# Patient Record
Sex: FEMALE | Race: WHITE | NOT HISPANIC OR LATINO | Employment: OTHER | URBAN - METROPOLITAN AREA
[De-identification: names, ages, dates, MRNs, and addresses within clinical notes are randomized per-mention and may not be internally consistent; named-entity substitution may affect disease eponyms.]

---

## 2023-09-29 DIAGNOSIS — Z96.612 STATUS POST TOTAL SHOULDER REPLACEMENT, LEFT: Primary | ICD-10-CM

## 2023-09-29 DIAGNOSIS — M25.512 LEFT SHOULDER PAIN, UNSPECIFIED CHRONICITY: ICD-10-CM

## 2023-10-01 PROBLEM — M17.11 PRIMARY OSTEOARTHRITIS OF RIGHT KNEE: Status: ACTIVE | Noted: 2023-10-01

## 2023-10-01 PROBLEM — F41.9 ANXIETY: Status: ACTIVE | Noted: 2023-10-01

## 2023-10-01 PROBLEM — C44.519 BASAL CELL CARCINOMA OF SKIN OF OTHER PART OF TRUNK: Status: ACTIVE | Noted: 2021-07-20

## 2023-10-01 PROBLEM — G24.01 TARDIVE DYSKINESIA: Status: ACTIVE | Noted: 2023-10-01

## 2023-10-01 PROBLEM — L81.4 OTHER MELANIN HYPERPIGMENTATION: Status: ACTIVE | Noted: 2021-07-20

## 2023-10-01 PROBLEM — S81.819A SKIN TEAR OF LOWER LEG WITHOUT COMPLICATION, INITIAL ENCOUNTER: Status: ACTIVE | Noted: 2023-10-01

## 2023-10-01 PROBLEM — L57.0 ACTINIC KERATOSIS: Status: ACTIVE | Noted: 2021-07-20

## 2023-10-01 PROBLEM — B37.2 CANDIDAL INTERTRIGO: Status: ACTIVE | Noted: 2023-10-01

## 2023-10-01 PROBLEM — S69.91XA INJURY OF RIGHT WRIST: Status: ACTIVE | Noted: 2023-10-01

## 2023-10-01 PROBLEM — R07.9 CHEST PAIN: Status: ACTIVE | Noted: 2023-10-01

## 2023-10-01 PROBLEM — L85.3 XEROSIS CUTIS: Status: ACTIVE | Noted: 2021-07-20

## 2023-10-01 PROBLEM — F32.5 MAJOR DEPRESSIVE DISORDER IN REMISSION (CMS-HCC): Status: ACTIVE | Noted: 2023-10-01

## 2023-10-01 PROBLEM — I26.99 PULMONARY EMBOLISM (MULTI): Status: ACTIVE | Noted: 2023-10-01

## 2023-10-01 PROBLEM — D22.5 MELANOCYTIC NEVI OF TRUNK: Status: ACTIVE | Noted: 2021-07-20

## 2023-10-01 PROBLEM — S42.253A CLOSED FRACTURE OF GREATER TUBEROSITY OF HUMERUS: Status: ACTIVE | Noted: 2023-10-01

## 2023-10-01 PROBLEM — Z85.828 PERSONAL HISTORY OF OTHER MALIGNANT NEOPLASM OF SKIN: Status: ACTIVE | Noted: 2021-07-20

## 2023-10-01 PROBLEM — H93.299 ABNORMAL AUDITORY PERCEPTION: Status: ACTIVE | Noted: 2023-10-01

## 2023-10-01 PROBLEM — H90.3 SENSORINEURAL HEARING LOSS, BILATERAL: Status: ACTIVE | Noted: 2023-10-01

## 2023-10-01 PROBLEM — Z96.1 PSEUDOPHAKIA OF BOTH EYES: Status: ACTIVE | Noted: 2023-10-01

## 2023-10-01 PROBLEM — G25.81 RESTLESS LEGS SYNDROME: Status: ACTIVE | Noted: 2023-10-01

## 2023-10-01 PROBLEM — B02.9 HERPES ZOSTER: Status: ACTIVE | Noted: 2023-10-01

## 2023-10-01 PROBLEM — S42.209A CLOSED FRACTURE OF UPPER END OF HUMERUS: Status: ACTIVE | Noted: 2023-10-01

## 2023-10-01 PROBLEM — K21.9 GASTROESOPHAGEAL REFLUX DISEASE: Status: ACTIVE | Noted: 2023-10-01

## 2023-10-01 PROBLEM — H61.23 EXCESSIVE CERUMEN IN BOTH EAR CANALS: Status: ACTIVE | Noted: 2023-10-01

## 2023-10-01 PROBLEM — M89.8X1 CHRONIC SCAPULAR PAIN: Status: ACTIVE | Noted: 2023-10-01

## 2023-10-01 PROBLEM — M79.604 PAIN OF RIGHT LOWER EXTREMITY: Status: ACTIVE | Noted: 2023-10-01

## 2023-10-01 PROBLEM — L23.9 ALLERGIC CONTACT DERMATITIS, UNSPECIFIED CAUSE: Status: ACTIVE | Noted: 2021-07-20

## 2023-10-01 PROBLEM — H69.93 DYSFUNCTION OF BOTH EUSTACHIAN TUBES: Status: ACTIVE | Noted: 2023-10-01

## 2023-10-01 PROBLEM — G25.81 IRON DEFICIENCY ASSOCIATED WITH NONFAMILIAL RESTLESS LEGS SYNDROME: Status: ACTIVE | Noted: 2023-10-01

## 2023-10-01 PROBLEM — M17.10 LOCALIZED PRIMARY OSTEOARTHRITIS OF LOWER LEG: Status: ACTIVE | Noted: 2023-10-01

## 2023-10-01 PROBLEM — H93.13 TINNITUS OF BOTH EARS: Status: ACTIVE | Noted: 2023-10-01

## 2023-10-01 PROBLEM — D48.5 NEOPLASM OF UNCERTAIN BEHAVIOR OF SKIN: Status: ACTIVE | Noted: 2021-07-20

## 2023-10-01 PROBLEM — I10 ESSENTIAL HYPERTENSION: Status: ACTIVE | Noted: 2023-10-01

## 2023-10-01 PROBLEM — G47.00 INSOMNIA: Status: ACTIVE | Noted: 2023-10-01

## 2023-10-01 PROBLEM — J44.9 CHRONIC OBSTRUCTIVE LUNG DISEASE (MULTI): Status: ACTIVE | Noted: 2023-10-01

## 2023-10-01 PROBLEM — E61.1 IRON DEFICIENCY ASSOCIATED WITH NONFAMILIAL RESTLESS LEGS SYNDROME: Status: ACTIVE | Noted: 2023-10-01

## 2023-10-01 PROBLEM — J31.0 CHRONIC RHINITIS: Status: ACTIVE | Noted: 2023-10-01

## 2023-10-01 PROBLEM — L30.9 DERMATITIS, UNSPECIFIED: Status: ACTIVE | Noted: 2021-07-20

## 2023-10-01 PROBLEM — S81.819A LACERATION OF LOWER EXTREMITY: Status: ACTIVE | Noted: 2023-10-01

## 2023-10-01 PROBLEM — R85.9: Status: ACTIVE | Noted: 2023-10-01

## 2023-10-01 PROBLEM — C44.612 BASAL CELL CARCINOMA OF SKIN OF RIGHT UPPER LIMB, INCLUDING SHOULDER: Status: ACTIVE | Noted: 2021-07-20

## 2023-10-01 PROBLEM — D18.01 HEMANGIOMA OF SKIN AND SUBCUTANEOUS TISSUE: Status: ACTIVE | Noted: 2021-07-20

## 2023-10-01 PROBLEM — G89.29 CHRONIC SCAPULAR PAIN: Status: ACTIVE | Noted: 2023-10-01

## 2023-10-01 PROBLEM — I87.2 CHRONIC VENOUS INSUFFICIENCY: Status: ACTIVE | Noted: 2021-07-20

## 2023-10-01 PROBLEM — K52.9 GASTROENTERITIS, ACUTE: Status: ACTIVE | Noted: 2023-10-01

## 2023-10-01 PROBLEM — H52.31 ANISOMETROPIA: Status: ACTIVE | Noted: 2023-10-01

## 2023-10-01 RX ORDER — ALPRAZOLAM 0.5 MG/1
TABLET ORAL
COMMUNITY
Start: 2020-12-31

## 2023-10-01 RX ORDER — FLUTICASONE PROPIONATE 44 UG/1
2 AEROSOL, METERED RESPIRATORY (INHALATION) 2 TIMES DAILY
COMMUNITY
Start: 2023-09-01

## 2023-10-01 RX ORDER — LOSARTAN POTASSIUM 100 MG/1
100 TABLET ORAL DAILY
COMMUNITY

## 2023-10-01 RX ORDER — AMMONIUM LACTATE 12 G/100G
CREAM TOPICAL
COMMUNITY
Start: 2018-11-12

## 2023-10-01 RX ORDER — ATORVASTATIN CALCIUM 80 MG/1
1 TABLET, FILM COATED ORAL DAILY
COMMUNITY
Start: 2021-04-06

## 2023-10-01 RX ORDER — ZOLPIDEM TARTRATE 12.5 MG/1
12.5 TABLET, FILM COATED, EXTENDED RELEASE ORAL NIGHTLY
COMMUNITY
Start: 2023-07-10

## 2023-10-01 RX ORDER — ACETAMINOPHEN 325 MG/1
650 TABLET ORAL EVERY 6 HOURS
COMMUNITY
Start: 2015-12-18

## 2023-10-01 RX ORDER — TRAMADOL HYDROCHLORIDE 50 MG/1
50 TABLET ORAL EVERY 6 HOURS PRN
COMMUNITY
Start: 2023-06-26

## 2023-10-01 RX ORDER — BENZONATATE 100 MG/1
100 CAPSULE ORAL 3 TIMES DAILY PRN
COMMUNITY
Start: 2023-05-01

## 2023-10-01 RX ORDER — ACETAMINOPHEN 500 MG
TABLET ORAL
COMMUNITY

## 2023-10-01 RX ORDER — FLUOXETINE 10 MG/1
CAPSULE ORAL
COMMUNITY

## 2023-10-01 RX ORDER — POLYETHYLENE GLYCOL 1450
POWDER (GRAM) MISCELLANEOUS
COMMUNITY
Start: 2021-04-06

## 2023-10-01 RX ORDER — TRIAMCINOLONE ACETONIDE 1 MG/G
CREAM TOPICAL
COMMUNITY
Start: 2020-06-26

## 2023-10-01 RX ORDER — MELOXICAM 7.5 MG/1
7.5 TABLET ORAL DAILY PRN
COMMUNITY
Start: 2022-12-27

## 2023-10-01 RX ORDER — BUSPIRONE HYDROCHLORIDE 5 MG/1
5 TABLET ORAL 3 TIMES DAILY
COMMUNITY
Start: 2023-08-08

## 2023-10-01 RX ORDER — MUPIROCIN 20 MG/G
OINTMENT TOPICAL
COMMUNITY
Start: 2022-08-02

## 2023-10-01 RX ORDER — HYDROCHLOROTHIAZIDE 12.5 MG/1
1 CAPSULE ORAL DAILY
COMMUNITY
Start: 2021-04-06

## 2023-10-01 RX ORDER — HYDROCORTISONE 25 MG/G
CREAM TOPICAL
COMMUNITY
Start: 2021-07-20

## 2023-10-01 RX ORDER — SOLIFENACIN SUCCINATE 10 MG/1
TABLET, FILM COATED ORAL
COMMUNITY
Start: 2021-04-06

## 2023-10-01 RX ORDER — GABAPENTIN 300 MG/1
300 CAPSULE ORAL 4 TIMES DAILY
COMMUNITY

## 2023-10-01 RX ORDER — AZELASTINE 1 MG/ML
2 SPRAY, METERED NASAL 2 TIMES DAILY
COMMUNITY

## 2023-10-01 RX ORDER — DOCUSATE SODIUM 100 MG/1
1 CAPSULE, LIQUID FILLED ORAL 2 TIMES DAILY
COMMUNITY
Start: 2021-04-06

## 2023-10-01 RX ORDER — FLUOXETINE HYDROCHLORIDE 40 MG/1
40 CAPSULE ORAL DAILY
COMMUNITY
Start: 2023-08-08

## 2023-10-01 RX ORDER — FAMOTIDINE 40 MG/1
1 TABLET, FILM COATED ORAL DAILY
COMMUNITY
Start: 2021-04-06

## 2023-10-01 RX ORDER — COVID-19 ANTIGEN TEST
KIT MISCELLANEOUS
COMMUNITY
Start: 2023-03-23

## 2023-10-01 RX ORDER — PREDNISONE 10 MG/1
10 TABLET ORAL
COMMUNITY
Start: 2021-05-28

## 2023-10-01 RX ORDER — POLYETHYLENE GLYCOL 3350 17 G/17G
17 POWDER, FOR SOLUTION ORAL AS NEEDED
COMMUNITY

## 2023-10-01 RX ORDER — DOXYCYCLINE 100 MG/1
100 CAPSULE ORAL 2 TIMES DAILY
COMMUNITY
Start: 2023-01-20

## 2023-10-01 RX ORDER — ALBUTEROL SULFATE 90 UG/1
2 AEROSOL, METERED RESPIRATORY (INHALATION) EVERY 4 HOURS PRN
COMMUNITY
Start: 2023-02-20

## 2023-10-01 RX ORDER — ZOLPIDEM TARTRATE 6.25 MG/1
1 TABLET, FILM COATED, EXTENDED RELEASE ORAL NIGHTLY PRN
COMMUNITY
Start: 2021-04-28

## 2023-10-01 RX ORDER — BETAMETHASONE DIPROPIONATE 0.5 MG/G
CREAM TOPICAL
COMMUNITY
Start: 2021-05-26

## 2023-10-01 RX ORDER — AMLODIPINE BESYLATE 10 MG/1
10 TABLET ORAL DAILY
COMMUNITY

## 2023-10-01 RX ORDER — AMOXICILLIN AND CLAVULANATE POTASSIUM 875; 125 MG/1; MG/1
875 TABLET, FILM COATED ORAL 2 TIMES DAILY
COMMUNITY
Start: 2023-01-17

## 2023-10-01 RX ORDER — FLUTICASONE PROPIONATE 50 MCG
2 SPRAY, SUSPENSION (ML) NASAL DAILY
COMMUNITY

## 2023-10-03 ENCOUNTER — APPOINTMENT (OUTPATIENT)
Dept: PHYSICAL THERAPY | Facility: CLINIC | Age: 78
End: 2023-10-03
Payer: MEDICARE

## 2023-10-05 ENCOUNTER — TREATMENT (OUTPATIENT)
Dept: PHYSICAL THERAPY | Facility: CLINIC | Age: 78
End: 2023-10-05
Payer: MEDICARE

## 2023-10-05 DIAGNOSIS — Z96.612 STATUS POST TOTAL SHOULDER REPLACEMENT, LEFT: ICD-10-CM

## 2023-10-05 DIAGNOSIS — M25.512 LEFT SHOULDER PAIN, UNSPECIFIED CHRONICITY: ICD-10-CM

## 2023-10-05 PROCEDURE — 97110 THERAPEUTIC EXERCISES: CPT | Mod: GP,CQ

## 2023-10-05 ASSESSMENT — ENCOUNTER SYMPTOMS
LOSS OF SENSATION IN FEET: 0
DEPRESSION: 1
OCCASIONAL FEELINGS OF UNSTEADINESS: 0

## 2023-10-05 NOTE — PROGRESS NOTES
"Physical Therapy    Physical Therapy Treatment and Discharge Summary    Patient Name: Margaret Thomas  MRN: [id number]  Today's Date: 10/5/2023    Time Calculation  Start Time: 1015  Stop Time: 1100  Time Calculation (min): 45 min    Visit #: 20 out of 20  Evaluation date: 7/18/23    Current Problem:   1. Status post total shoulder replacement, left  Follow Up In Physical Therapy      2. Left shoulder pain, unspecified chronicity  Follow Up In Physical Therapy          ASSESSMENT:   Completed todays session without incident. Pt is functionally independent. Focused todays session on reviewing and updated patients home exercise program. Pt was able to show good return demonstration current exercise program. Recommend  pt continue at home with current HEP for further gains in left shoulder ROM and strength.     PLAN:   Pt appropriate for discharge with HEP to promote self management.  OP PT Plan  Treatment/Interventions: Education/ Instruction, Manual therapy, Therapeutic activities  PT Plan: Skilled PT  PT Frequency: 2 times per week  Duration: 8 weeks (+ 4 visits 1x/week for total 20 visits to promote self managment)  Rehab Potential: Good  Plan of Care Agreement: Patient      SUBJECTIVE:   Pt able to perform all dressing needs with increased time and effort and modified technique. Performs HEP \"every other day\"  requesting printout for pictures. Able to sleep through the night without left shoulder sx's waking her           Pain: Current pain level: 1/10 \"sore\". Pain at highest 3/10         OBJECTIVE:    AROM left shoulder   Flexion supine 155 degrees, standing 115 degrees  Abduction 110 degrees standing  Int rotation to back pocket    MMT left shoulder  Flexion 4+/5  Abd 4/5  Int rotation 4+/5  Ext rotation 4/5      Treatments:  Therapeutic Exercise: (45 minutes)   Review and updated pictures for current HEP as follows  Standing shoulder flexion towel slide at wall 10 x 2  Left shoulder isometric flexion /int " rotation/ext rotation 10 x 2 each  Scap adduction orange theraband 10 x 2  S/L left shl ext rotation 1# 10 x 2  S/L left shl flexion 1# 10 x 2  Supine AROM left shl flexion 10 x 2         HEP:  See above.  Handouts issued    Goals:   ST. Pt will improve pain levels from 4/10 to </=2/10 to improve quality of life.--PROGRESSING (3/10)  2. Pt will improve R shoulder AAROM to WFL in order to improve mobility.--MET  3. Pt will maintain surgical precautions to promote healing and optimal surgical outcomes.--MET    LT. Pt will improve R shoulder AROM to WFL in order to improve ability to don/doff clothing and do hair without compensation. (Progressing, can perform with compensation)  2. Pt will improve R shoulder strength to >/=4+/5 to promote improved GHJ stabilty and ADL's. ( Progressing)  3. Pt will ambulate without gait deviation to promote return to prior level of function and activity.(Achieved)

## 2023-11-14 ENCOUNTER — DOCUMENTATION (OUTPATIENT)
Dept: PHYSICAL THERAPY | Facility: CLINIC | Age: 78
End: 2023-11-14
Payer: MEDICARE

## 2023-11-14 NOTE — PROGRESS NOTES
Physical Therapy    Discharge Summary    Name: Margaret Thomas  MRN: [id number]  : 1945  Date: 23    Medical Diagnosis: S/P L reverse total shoulder for proximal humerus fracture   Therapy Diagnosis: L shoulder pain, decrease mobility, LE weakness    Discharge Summary: PT    Discharge Information: Date of last visit 10/5/23, Date of evaluation 23, and Number of attended visits 20    Therapy Summary: Patient made good progress through POC and is functionally independent. Patient has met 2/3 short term goals and 1/3 long term goals limited by pain, ROM and strength. Patient has improved AROM/AAROM and strength compared to evaluation. Patient is independent with HEP. Patient is recommended to continue current HEP to continue to improve L shoulder AROM and strength.     ST. Pt will improve pain levels from 4/10 to </=2/10 to improve quality of life.--PROGRESSING (3/10)  2. Pt will improve R shoulder AAROM to WFL in order to improve mobility.--MET  3. Pt will maintain surgical precautions to promote healing and optimal surgical outcomes.--MET    LT. Pt will improve R shoulder AROM to WFL in order to improve ability to don/doff clothing and do hair without compensation. -PROGRESSING (can perform with compensation)  2. Pt will improve R shoulder strength to >/=4+/5 to promote improved GHJ stabilty and ADL's. -PROGRESSING  3. Pt will ambulate without gait deviation to promote return to prior level of function and activity.-ACHIEVED     Rehab Discharge Reason: Achieved all and/or the most significant goals(s)

## 2023-11-20 ENCOUNTER — EVALUATION (OUTPATIENT)
Dept: PHYSICAL THERAPY | Facility: CLINIC | Age: 78
End: 2023-11-20
Payer: MEDICARE

## 2023-11-20 DIAGNOSIS — M54.50 CHRONIC LOW BACK PAIN: Primary | ICD-10-CM

## 2023-11-20 DIAGNOSIS — M53.3 SACROCOCCYGEAL DISORDERS, NOT ELSEWHERE CLASSIFIED: ICD-10-CM

## 2023-11-20 DIAGNOSIS — G89.29 CHRONIC LOW BACK PAIN: Primary | ICD-10-CM

## 2023-11-20 DIAGNOSIS — M48.062 SPINAL STENOSIS, LUMBAR REGION WITH NEUROGENIC CLAUDICATION: ICD-10-CM

## 2023-11-20 PROCEDURE — 97161 PT EVAL LOW COMPLEX 20 MIN: CPT | Mod: GP

## 2023-11-20 PROCEDURE — 97110 THERAPEUTIC EXERCISES: CPT | Mod: GP

## 2023-11-20 NOTE — PROGRESS NOTES
Physical Therapy    Physical Therapy Evaluation and Treatment      Patient Name: Margaret Thomas  MRN: [id number]  Today's Date: 11/20/2023    Time Calculation  Start Time: 1207  Stop Time: 1245  Time Calculation (min): 38 min    Current Problem:   1. Chronic low back pain  Follow Up In Physical Therapy      2. Sacrococcygeal disorders, not elsewhere classified  PT eval and treat      3. Spinal stenosis, lumbar region with neurogenic claudication  PT eval and treat          SUBJECTIVE:  Margaret Thomas is a 78 y.o. female referred to PT for LBP. Patient presents with chief complaint of R SIJ/ R hip region. Patient reports that back pain has bothering her 1973 pruning tree and pulled something in her back. Patient went to chiropractor x-ray revealing arthritis in lower back. Recently patient went out of town around 10/16 and reports that before to leaving was sifting dirt and thinks she aggravated her back. Patient went back to chiropractor who did manipulation and acupuncture which helped to reduce pain with transfers but still present. Patient would like injection but is going through PT first. Patient denies radicular pain, denies numbness/tingling. Localized pain in R SIJ/R hip. Patient reports that she has PAD and is encouraged to walk 30 minutes every day but is unable to secondary to low back pain, only able to walk 10 minutes at a time. Patient reports that she plans to walk in the mall during winter time.   Stair negotiation current performing step to pattern secondary to fatigue in legs. Patient does have stairs in home with railing on both sides.     Imaging: none    Occupation: Retired   Hobbies: Everything; stays active, work outside in garden and yard.   Home living: Living with sister     Relevant Past Medical History:Reviewed in chart ; HTN, Vascular Disease, Headahces,   Surgical History: Reviewed in chart; R TKR (2015), L rTSA (5/19/23)   Medications: Reviewed in chart  Allergies: Reviewed in  "chart    Precautions: PMHx considerations: HTN, Vascular Disease  STEADI Fall Risk: will do at next follow up (score of 4+ indicates fall risk)       PT Outcome Measure:   Modified Oswestry: 23/50= 46%     Pain:  Lowest:  6-7/10  Highest:  10/10  Location:  R SIJ, R hip   Description:  sharp, constant ache  Aggravating Factors:  transfer sit to stand, standing, walking, lifting   Relieving Factors:  heat  Sleep Pattern:  stomach sleeper; can feel some discomfort with rolling  Previous Interventions:  Cortizone 1970s but nothing recent       Red flags: denies numbness/tingling or saddle paresthesia, no changes to bowel or bladder    Patient/Family Goal: relieve pain, improve quality of life    OBJECTIVE:    Observation/Posture: Forward head and forward shoulder posture     Gait: Intermittent R lateral lean, decreased step length and gait speed     Functional Mobility: Patient demonstrates sit to stand transfer with UE support.     Range of Motion:   LUMBAR ROM AROM    LEFT RIGHT   Sidebend 25 % limited, pain * 25% limited, pain**   Rotation WFL, pain free  WFL, pain   Flexion WFL, B hamstring tightness    Extension WFL , pain free      HIP ROM AROM    LEFT RIGHT   Flexion WFL, pain * WFL, pain **     Strength:  HIP MMT LEFT RIGHT   Flexion 4-/5 4-/5   Extension - -   Abduction 4/5 4/5   Adduction 4/5 4/5     KNEE MMT LEFT RIGHT   Flexion 4+/5 4+/5   Extension 4+/5 4+/5     ANKLE MMT LEFT RIGHT   Dorsiflexion 5/5 5/5   Flexibility:   FLEXIBILITY LEFT RIGHT   Hamstring Min limitation Min limitation     Special Testing:  SPECIAL TEST LEFT RIGHT   SLR negative negative   BARBI negative negative     Pelvic Symmetry:   LLD: symmetrical    ASIS: symmetrical     Treatments:  Therapeutic Exercise: (10 minutes)  H/L Hip ADD (ball) 3\" x10  H/L Hip ABD (green TB) x10  H/L TrA brace x10  H/L glute set x10   Manual Therapy: ( minutes)    Gait Training: ( minutes)    Neuromuscular Re-education: ( minutes)    Therapeutic Activity: " (5 minutes)  OP Education: Pt education on purpose of interventions in order to improve postural strength and promote spinal stability. Pt education on importance of core strength with low back pain. Pt education on proper seated posture to reduce stress on spine.      HEP:  Access Code: [email or web address]  URL: [email or web address]  Date: 11/20/2023  Prepared by: Brooke    Exercises  - Supine Transversus Abdominis Bracing - Hands on Stomach  - 1 x daily - 7 x weekly - 2 sets - 10 reps  - Hooklying Gluteal Sets  - 1 x daily - 7 x weekly - 2 sets - 10 reps  - Supine Hip Adduction Isometric with Ball  - 1 x daily - 7 x weekly - 2 sets - 10 reps  - Hooklying Clamshell with Resistance  - 1 x daily - 7 x weekly - 2 sets - 10 reps  ASSESSMENT:  Margraet Thomas is a 78 y.o. female referred to PT for LBP. Patient presents with chief complaint of R SIJ/ R hip region. Pain worse with transfer from sit to stand, walking, standing and lifting. Patient demonstrates lumbar AROM WFL in all planes with exception of lateral flexion. Patient reports pain with lateral flexion R>L and rotation R>L. Patient demonstrates decreased gross LE strength, poor posture, and poor gait mechanics. Symmetrical pelvic alignment at evaluation. Patient would benefit from skilled PT in order to promote painfree functional mobility and address aforementioned impairments.     Response to treatment: Pt verbalized good understanding of education provided. Pt demonstrated appropriate performance of HEP with good understanding. Did not exhibit exacerbation of symptoms at end of session.     PLAN:  OP PT PLAN:  Treatment/Interventions: Education/Instruction , Gait training , Manual Therapy  , Neuromuscular re-education , Self care/home management , Therapeutic activities , and Therapeutic exercise    PT Plan: Skilled PT   PT Frequency: 1-2 times per week  Duration: 6 weeks  Visits Approved: MN  Rehab Potential: Good  Plan of Care Agreement:  Patient    Goals:  SHORT TERM GOALS:  Pt will report decrease in pain from 10/10 to </= 5/10 to improve quality of life.  Pt will demonstrate sit to stand transfer x10 without UE pain free in order to demonstrate improved LE strength   Pt will demonstrate painfree lumbar AROM in all planes in order to improve mobility.    LONG TERM GOALS:  Pt will report decrease in pain from 10/10 to </= 3/10 to improve quality of life.  Pt will perform reciprocal stair negotiation with UE support to demonstrate improved LE strength   Pt will ambulate for >30 minutes pain free to promote return to PLOF and improved vascularity.   Pt will improve score on Modified Oswestry from 46% to </= 23% impairment reflect improvement in functional mobility.

## 2023-11-20 NOTE — LETTER
November 20, 2023    Becka Ramirez  [address]  [facility] [address]    Patient: Margaret Thomas   YOB: 1945   Date of Visit: 11/20/2023       Dear Nitin Weston Md  [address]  [address]  [place] [address]    The attached plan of care is being sent to you because your patient’s medical reimbursement requires that you certify the plan of care. Your signature is required to allow uninterrupted insurance coverage.      You may indicate your approval by signing below and faxing this form back to us at No information on file..    Please call No information on file. with any questions or concerns.    Thank you for this referral,        Brooke Mohamud PT  [facility] [address] [place] [address] [facility] [address]  [facility] [address]    Payer: Payor: [employer]ICARE / Plan: [employer] MEDICARE / Product Type: *No Product type* /                                                                         Date:     Dear Brooke Mohamud PT,     Re: Ms. Margaret Thomas, MRN:[id number]    I certify that I have reviewed the attached plan of care and it is medically necessary for Ms. Margaret Thomas (1945) who is under my care.          ______________________________________                    _________________  Provider name and credentials                                           Date and time                                                                                           Plan of Care 11/20/23   Effective from: 11/20/2023  Effective to: 2/18/2024    Plan ID: [id number]            Participants as of Finalize on 11/20/2023    Name Type Comments Contact Info    Becka Ramirez Consulting Physician  [phone]    Brooke Mohamud PT Physical Therapist  [phone]       Last Plan Note     Author: Brooke Mohamud PT Status: Sign when Signing Visit Last edited: 11/20/2023 12:00 PM       Physical Therapy    Physical Therapy Evaluation and  Treatment      Patient Name: Margaret Thomas  MRN: [id number]  Today's Date: 11/20/2023    Time Calculation  Start Time: 1207  Stop Time: 1245  Time Calculation (min): 38 min    Current Problem:   1. Chronic low back pain  Follow Up In Physical Therapy      2. Sacrococcygeal disorders, not elsewhere classified  PT eval and treat      3. Spinal stenosis, lumbar region with neurogenic claudication  PT eval and treat          SUBJECTIVE:  Margaret Thomas is a 78 y.o. female referred to PT for LBP. Patient presents with chief complaint of R SIJ/ R hip region. Patient reports that back pain has bothering her 1973 pruning tree and pulled something in her back. Patient went to chiropractor x-ray revealing arthritis in lower back. Recently patient went out of town around 10/16 and reports that before to leaving was sifting dirt and thinks she aggravated her back. Patient went back to chiropractor who did manipulation and acupuncture which helped to reduce pain with transfers but still present. Patient would like injection but is going through PT first. Patient denies radicular pain, denies numbness/tingling. Localized pain in R SIJ/R hip. Patient reports that she has PAD and is encouraged to walk 30 minutes every day but is unable to secondary to low back pain, only able to walk 10 minutes at a time. Patient reports that she plans to walk [place] the mall during winter time.   Stair negotiation current performing step to pattern secondary to fatigue in legs. Patient does have stairs in home with railing on both sides.     Imaging: none    Occupation: Retired   Hobbies: Everything; stays active, work outside in garden and yard.   Home living: Living with sister     Relevant Past Medical History:Reviewed in chart ; HTN, Vascular Disease, Headahces,   Surgical History: Reviewed in chart; R TKR (2015), L rTSA (5/19/23)   Medications: Reviewed in chart  Allergies: Reviewed in chart    Precautions: PMHx considerations: HTN,  "Vascular Disease  STEADI Fall Risk: will do at next follow up (score of 4+ indicates fall risk)       PT Outcome Measure:   Modified Oswestry: 23/50= 46%     Pain:  Lowest:  6-7/10  Highest:  10/10  Location:  R SIJ, R hip   Description:  sharp, constant ache  Aggravating Factors:  transfer sit to stand, standing, walking, lifting   Relieving Factors:  heat  Sleep Pattern:  stomach sleeper; can feel some discomfort with rolling  Previous Interventions:  Cortizone 1970s but nothing recent       Red flags: denies numbness/tingling or saddle paresthesia, no changes to bowel or bladder    Patient/Family Goal: relieve pain, improve quality of life    OBJECTIVE:    Observation/Posture: Forward head and forward shoulder posture     Gait: Intermittent R lateral lean, decreased step length and gait speed     Functional Mobility: Patient demonstrates sit to stand transfer with UE support.     Range of Motion:   LUMBAR ROM AROM    LEFT RIGHT   Sidebend 25 % limited, pain * 25% limited, pain**   Rotation WFL, pain free  WFL, pain   Flexion WFL, B hamstring tightness    Extension WFL , pain free      HIP ROM AROM    LEFT RIGHT   Flexion WFL, pain * WFL, pain **     Strength:  HIP MMT LEFT RIGHT   Flexion 4-/5 4-/5   Extension - -   Abduction 4/5 4/5   Adduction 4/5 4/5     KNEE MMT LEFT RIGHT   Flexion 4+/5 4+/5   Extension 4+/5 4+/5     ANKLE MMT LEFT RIGHT   Dorsiflexion 5/5 5/5   Flexibility:   FLEXIBILITY LEFT RIGHT   Hamstring Min limitation Min limitation     Special Testing:  SPECIAL TEST LEFT RIGHT   SLR negative negative   BARBI negative negative     Pelvic Symmetry:   LLD: symmetrical    ASIS: symmetrical     Treatments:  Therapeutic Exercise: (10 minutes)  H/L Hip ADD (ball) 3\" x10  H/L Hip ABD (green TB) x10  H/L TrA brace x10  H/L glute set x10   Manual Therapy: ( minutes)    Gait Training: ( minutes)    Neuromuscular Re-education: ( minutes)    Therapeutic Activity: (5 minutes)  OP Education: Pt education on " purpose of interventions in order to improve postural strength and promote spinal stability. Pt education on importance of core strength with low back pain. Pt education on proper seated posture to reduce stress on spine.      HEP:  Access Code: [email or web address]  URL: [email or web address]  Date: 11/20/2023  Prepared by: Brooke    Exercises  - Supine Transversus Abdominis Bracing - Hands on Stomach  - 1 x daily - 7 x weekly - 2 sets - 10 reps  - Hooklying Gluteal Sets  - 1 x daily - 7 x weekly - 2 sets - 10 reps  - Supine Hip Adduction Isometric with Ball  - 1 x daily - 7 x weekly - 2 sets - 10 reps  - Hooklying Clamshell with Resistance  - 1 x daily - 7 x weekly - 2 sets - 10 reps  ASSESSMENT:  Margaret Thomas is a 78 y.o. female referred to PT for LBP. Patient presents with chief complaint of R SIJ/ R hip region. Pain worse with transfer from sit to stand, walking, standing and lifting. Patient demonstrates lumbar AROM WFL in all planes with exception of lateral flexion. Patient reports pain with lateral flexion R>L and rotation R>L. Patient demonstrates decreased gross LE strength, poor posture, and poor gait mechanics. Symmetrical pelvic alignment at evaluation. Patient would benefit from skilled PT in order to promote painfree functional mobility and address aforementioned impairments.     Response to treatment: Pt verbalized good understanding of education provided. Pt demonstrated appropriate performance of HEP with good understanding. Did not exhibit exacerbation of symptoms at end of session.     PLAN:  OP PT PLAN:  Treatment/Interventions: Education/Instruction , Gait training , Manual Therapy  , Neuromuscular re-education , Self care/home management , Therapeutic activities , and Therapeutic exercise    PT Plan: Skilled PT   PT Frequency: 1-2 times per week  Duration: 6 weeks  Visits Approved: MN  Rehab Potential: Good  Plan of Care Agreement: Patient    Goals:  SHORT TERM GOALS:  Pt will report decrease  in pain from 10/10 to </= 5/10 to improve quality of life.  Pt will demonstrate sit to stand transfer x10 without UE pain free in order to demonstrate improved LE strength   Pt will demonstrate painfree lumbar AROM in all planes in order to improve mobility.    LONG TERM GOALS:  Pt will report decrease in pain from 10/10 to </= 3/10 to improve quality of life.  Pt will perform reciprocal stair negotiation with UE support to demonstrate improved LE strength   Pt will ambulate for >30 minutes pain free to promote return to PLOF and improved vascularity.   Pt will improve score on Modified Oswestry from 46% to </= 23% impairment reflect improvement in functional mobility.          Current Participants as of 11/20/2023    Name Type Comments Contact Info    Becka Ramirez Consulting Physician  [phone]    Signature pending    Brooke Mohamud PT Physical Therapist  [phone]

## 2023-11-20 NOTE — Clinical Note
November 21, 2023    Becka Ramirez  [address]  [facility] [address]    Patient: Margaret Thomas   YOB: 1945   Date of Visit: 11/20/2023       Dear Nitin Weston Md  [address]  [place] [address]  [place] [address]    The attached plan of care is being sent to you because your patient’s medical reimbursement requires that you certify the plan of care. Your signature is required to allow uninterrupted insurance coverage.      You may indicate your approval by signing below and faxing this form back to us at No information on file..    Please call No information on file. with any questions or concerns.    Thank you for this referral,        Brooke Mohamud PT  [facility] [place] [address] [address]    Payer: Payor: [employer] MEDICARE / Plan: [employer] MEDICARE / Product Type: *No Product type* /                                                                         Date:     Dear Brooke Mohamud PT,     Re: Ms. Margaret Thomas, MRN:[id number]    I certify that I have reviewed the attached plan of care and it is medically necessary for Ms. Margaret Thomas (1945) who is under my care.          ______________________________________                    _________________  Provider name and credentials                                           Date and time                                                                                           Plan of Care 11/20/23   Effective from: 11/20/2023  Effective to: 2/18/2024    Plan ID: [id number]            Participants as of Finalize on 11/21/2023    Name Type Comments Contact Info    Becka Ramirez Consulting Physician  [phone]    Brooke Mohamud PT Physical Therapist  [phone]       Last Plan Note     Author: Brooke Mohamud PT Status: Signed Last edited: 11/20/2023 12:00 PM       Physical Therapy    Physical Therapy Evaluation and Treatment      Patient Name: Margaret  William  MRN: [id number]  Today's Date: 11/20/2023    Time Calculation  Start Time: 1207  Stop Time: 1245  Time Calculation (min): 38 min    Current Problem:   1. Chronic low back pain  Follow Up In Physical Therapy      2. Sacrococcygeal disorders, not elsewhere classified  PT eval and treat      3. Spinal stenosis, lumbar region with neurogenic claudication  PT eval and treat          SUBJECTIVE:  Margaret Thomas is a 78 y.o. female referred to PT for LBP. Patient presents with chief complaint of R SIJ/ R hip region. Patient reports that back pain has bothering her 1973 pruning tree and pulled something in her back. Patient went to chiropractor x-ray revealing arthritis in lower back. Recently patient went out of town around 10/16 and reports that before to leaving was sifting dirt and thinks she aggravated her back. Patient went back to chiropractor who did manipulation and acupuncture which helped to reduce pain with transfers but still present. Patient would like injection but is going through PT first. Patient denies radicular pain, denies numbness/tingling. Localized pain in R SIJ/R hip. Patient reports that she has PAD and is encouraged to walk 30 minutes every day but is unable to secondary to low back pain, only able to walk 10 minutes at a time. Patient reports that she plans to walk in the mall during winter time.   Stair negotiation current performing step to pattern secondary to fatigue in legs. Patient does have stairs in home with railing on both sides.     Imaging: none    Occupation: Retired   Hobbies: Everything; stays active, work outside in garden and yard.   Home living: Living with sister     Relevant Past Medical History:Reviewed in chart ; HTN, Vascular Disease, Headahces,   Surgical History: Reviewed in chart; R TKR (2015), L rTSA (5/19/23)   Medications: Reviewed in chart  Allergies: Reviewed in chart    Precautions: PMHx considerations: HTN, Vascular Disease  STEADI Fall Risk: will do  "at next follow up (score of 4+ indicates fall risk)       PT Outcome Measure:   Modified Oswestry: 23/50= 46%     Pain:  Lowest:  6-7/10  Highest:  10/10  Location:  R SIJ, R hip   Description:  sharp, constant ache  Aggravating Factors:  transfer sit to stand, standing, walking, lifting   Relieving Factors:  heat  Sleep Pattern:  stomach sleeper; can feel some discomfort with rolling  Previous Interventions:  Cortizone 1970s but nothing recent       Red flags: denies numbness/tingling or saddle paresthesia, no changes to bowel or bladder    Patient/Family Goal: relieve pain, improve quality of life    OBJECTIVE:    Observation/Posture: Forward head and forward shoulder posture     Gait: Intermittent R lateral lean, decreased step length and gait speed     Functional Mobility: Patient demonstrates sit to stand transfer with UE support.     Range of Motion:   LUMBAR ROM AROM    LEFT RIGHT   Sidebend 25 % limited, pain * 25% limited, pain**   Rotation WFL, pain free  WFL, pain   Flexion WFL, B hamstring tightness    Extension WFL , pain free      HIP ROM AROM    LEFT RIGHT   Flexion WFL, pain * WFL, pain **     Strength:  HIP MMT LEFT RIGHT   Flexion 4-/5 4-/5   Extension - -   Abduction 4/5 4/5   Adduction 4/5 4/5     KNEE MMT LEFT RIGHT   Flexion 4+/5 4+/5   Extension 4+/5 4+/5     ANKLE MMT LEFT RIGHT   Dorsiflexion 5/5 5/5   Flexibility:   FLEXIBILITY LEFT RIGHT   Hamstring Min limitation Min limitation     Special Testing:  SPECIAL TEST LEFT RIGHT   SLR negative negative   BARBI negative negative     Pelvic Symmetry:   LLD: symmetrical    ASIS: symmetrical     Treatments:  Therapeutic Exercise: (10 minutes)  H/L Hip ADD (ball) 3\" x10  H/L Hip ABD (green TB) x10  H/L TrA brace x10  H/L glute set x10   Manual Therapy: ( minutes)    Gait Training: ( minutes)    Neuromuscular Re-education: ( minutes)    Therapeutic Activity: (5 minutes)  OP Education: Pt education on purpose of interventions in order to improve " postural strength and promote spinal stability. Pt education on importance of core strength with low back pain. Pt education on proper seated posture to reduce stress on spine.      HEP:  Access Code: [email or web address]  URL: [email or web address]  Date: 11/20/2023  Prepared by: Brooke    Exercises  - Supine Transversus Abdominis Bracing - Hands on Stomach  - 1 x daily - 7 x weekly - 2 sets - 10 reps  - Hooklying Gluteal Sets  - 1 x daily - 7 x weekly - 2 sets - 10 reps  - Supine Hip Adduction Isometric with Ball  - 1 x daily - 7 x weekly - 2 sets - 10 reps  - Hooklying Clamshell with Resistance  - 1 x daily - 7 x weekly - 2 sets - 10 reps  ASSESSMENT:  Margaret Thomas is a 78 y.o. female referred to PT for LBP. Patient presents with chief complaint of R SIJ/ R hip region. Pain worse with transfer from sit to stand, walking, standing and lifting. Patient demonstrates lumbar AROM WFL in all planes with exception of lateral flexion. Patient reports pain with lateral flexion R>L and rotation R>L. Patient demonstrates decreased gross LE strength, poor posture, and poor gait mechanics. Symmetrical pelvic alignment at evaluation. Patient would benefit from skilled PT in order to promote painfree functional mobility and address aforementioned impairments.     Response to treatment: Pt verbalized good understanding of education provided. Pt demonstrated appropriate performance of HEP with good understanding. Did not exhibit exacerbation of symptoms at end of session.     PLAN:  OP PT PLAN:  Treatment/Interventions: Education/Instruction , Gait training , Manual Therapy  , Neuromuscular re-education , Self care/home management , Therapeutic activities , and Therapeutic exercise    PT Plan: Skilled PT   PT Frequency: 1-2 times per week  Duration: 6 weeks  Visits Approved: [place]  Rehab Potential: Good  Plan of Care Agreement: Patient    Goals:  SHORT TERM GOALS:  Pt will report decrease in pain from 10/10 to </= 5/10 to improve  quality of life.  Pt will demonstrate sit to stand transfer x10 without UE pain free in order to demonstrate improved LE strength   Pt will demonstrate painfree lumbar AROM in all planes in order to improve mobility.    LONG TERM GOALS:  Pt will report decrease in pain from 10/10 to </= 3/10 to improve quality of life.  Pt will perform reciprocal stair negotiation with UE support to demonstrate improved LE strength   Pt will ambulate for >30 minutes pain free to promote return to PLOF and improved vascularity.   Pt will improve score on Modified Oswestry from 46% to </= 23% impairment reflect improvement in functional mobility.          Current Participants as of 11/21/2023    Name Type Comments Contact Info    Becka Ramirez Consulting Physician  [phone]    Signature pending    Brooke Mohamud PT Physical Therapist  [phone]

## 2023-11-28 ENCOUNTER — TREATMENT (OUTPATIENT)
Dept: PHYSICAL THERAPY | Facility: CLINIC | Age: 78
End: 2023-11-28
Payer: MEDICARE

## 2023-11-28 DIAGNOSIS — G89.29 CHRONIC LOW BACK PAIN: Primary | ICD-10-CM

## 2023-11-28 DIAGNOSIS — M54.50 CHRONIC LOW BACK PAIN: Primary | ICD-10-CM

## 2023-11-28 PROCEDURE — 97110 THERAPEUTIC EXERCISES: CPT | Mod: GP

## 2023-11-28 ASSESSMENT — ENCOUNTER SYMPTOMS
DEPRESSION: 1
OCCASIONAL FEELINGS OF UNSTEADINESS: 1
LOSS OF SENSATION IN FEET: 0

## 2023-11-28 NOTE — PROGRESS NOTES
"Physical Therapy    Physical Therapy Treatment    Patient Name: Margaret Thomas  MRN: [id number]  Today's Date: 11/28/2023    Time Calculation  Start Time: 1016  Stop Time: 1109  Time Calculation (min): 53 min    Visit #: 2 out of 12  Evaluation date: 11/20/23    Current Problem:   1. Chronic low back pain            SUBJECTIVE:   Patient reports that her back is ok today. Patient states that she feels sore after doing exercise.      Precautions: STEADI score: 9     Pain:   Start of session: 5/10       OBJECTIVE:      Treatments:  Therapeutic Exercise: (53 minutes)  NuStep L1 x10 mins   H/L TrA brace 2x15  H/L glute set 2x15   H/L Hip ADD (ball) 3\" 2x10  H/L Hip ABD (green TB) 2x10  Bridge 2x10  Standing hip ABD in front of mirror for visual feedback 2x10 ea. (DA support)  Standing hip EXT in front of mirror for visual feedback 2x10 ea. (DA support)  4\" forward toe tap x20 (SA support)  4\" forward step up 2x10 (SA support)  Sit to stand from mat table 2x10 (cues for upright posture)   Standing heel raise 2x15     Manual Therapy: ( minutes)    Gait Training: ( minutes)    Neuromuscular Re-education: ( minutes)    Therapeutic Activity: ( minutes)       HEP:  Access Code: [email or web address]  URL: [email or web address]  Date: 11/20/2023  Prepared by: Brooke     Exercises  - Supine Transversus Abdominis Bracing - Hands on Stomach  - 1 x daily - 7 x weekly - 2 sets - 10 reps  - Hooklying Gluteal Sets  - 1 x daily - 7 x weekly - 2 sets - 10 reps  - Supine Hip Adduction Isometric with Ball  - 1 x daily - 7 x weekly - 2 sets - 10 reps  - Hooklying Clamshell with Resistance  - 1 x daily - 7 x weekly - 2 sets - 10 reps    ASSESSMENT:   Patient performed NuStep at this date to promote improved cardiovascaular endurance and improve blood flow. Patient tolerated interventions to promote improved spinal stability and function. Patient demonstrates decreased LE muscular endurance through interventions requiring rest break.     Post " session pain: sore     PLAN:  Continue with current POC; update HEP next visit (bridge, sit to stand, heel raise)    OP PT PLAN:  Treatment/Interventions: Education/Instruction , Gait training , Manual Therapy  , Neuromuscular re-education , Self care/home management , Therapeutic activities , and Therapeutic exercise    PT Plan: Skilled PT   PT Frequency: 1-2 times per week  Duration:6 weeks  Visits Approved: [place]  Rehab Potential: Good  Plan of Care Agreement: Patient         Goals:   SHORT TERM GOALS:  Pt will report decrease in pain from 10/10 to </= 5/10 to improve quality of life. -PROGRESSING  Pt will demonstrate sit to stand transfer x10 without UE pain free in order to demonstrate improved LE strength -PROGRESSING  Pt will demonstrate painfree lumbar AROM in all planes in order to improve mobility.-PROGRESSING     LONG TERM GOALS:  Pt will report decrease in pain from 10/10 to </= 3/10 to improve quality of life.-PROGRESSING  Pt will perform reciprocal stair negotiation with UE support to demonstrate improved LE strength -PROGRESSING  Pt will ambulate for >30 minutes pain free to promote return to PLOF and improved vascularity. -PROGRESSING  Pt will improve score on Modified Oswestry from 46% to </= 23% impairment reflect improvement in functional mobility. -PROGRESSING

## 2023-11-30 ENCOUNTER — APPOINTMENT (OUTPATIENT)
Dept: PHYSICAL THERAPY | Facility: CLINIC | Age: 78
End: 2023-11-30
Payer: MEDICARE

## 2023-12-05 ENCOUNTER — TREATMENT (OUTPATIENT)
Dept: PHYSICAL THERAPY | Facility: CLINIC | Age: 78
End: 2023-12-05
Payer: MEDICARE

## 2023-12-05 DIAGNOSIS — G89.29 CHRONIC LOW BACK PAIN: ICD-10-CM

## 2023-12-05 DIAGNOSIS — M54.50 CHRONIC LOW BACK PAIN: ICD-10-CM

## 2023-12-05 PROCEDURE — 97110 THERAPEUTIC EXERCISES: CPT | Mod: GP,CQ

## 2023-12-05 NOTE — PROGRESS NOTES
"Physical Therapy    Physical Therapy Treatment    Patient Name: Margaret Thomas  MRN: [id number]  Today's Date: 12/5/2023    Time Calculation  Start Time: 1015  Stop Time: 1105  Time Calculation (min): 50 min    Visit #: 3 out of 12  Evaluation date: 11/20/23    Current Problem:   1. Chronic low back pain  Follow Up In Physical Therapy          SUBJECTIVE:    Patient states that she feels sore after doing exercise, therapy for a day or so . Sx's in lumbar mainly felt when walking or with standing up after sitting in a chair     Precautions: STEADI score: 9     Pain:   Start of session: 4-5/10       OBJECTIVE:      Treatments:  Therapeutic Exercise: (50 minutes)  NuStep L2 x10 mins ,with UE  H/L TrA brace 2x15  H/L glute set 2x15   H/L Hip ADD (ball) 3\" 2x10  H/L Hip ABD (green TB) 2x10  Bridge 2x10  Side stepping 15 feet x 4  Standing hip EXT in front of mirror for visual feedback (DA support)2x10  4\" forward toe tap 2x20 (SA support)  4\" lateral step up 2x10 (SA support)  Sit to stand from mat table 2x10 (cues for upright posture)   Standing heel raises 2 x10  Half tandem stance 10 sec x 3 each lead foot    Manual Therapy: ( minutes)    Gait Training: ( minutes)    Neuromuscular Re-education: ( minutes)    Therapeutic Activity: ( minutes)       HEP:  Access Code: [email or web address]  URL: [email or web address]  Date: 11/20/2023  Prepared by: Brooke     Exercises  - Supine Transversus Abdominis Bracing - Hands on Stomach  - 1 x daily - 7 x weekly - 2 sets - 10 reps  - Hooklying Gluteal Sets  - 1 x daily - 7 x weekly - 2 sets - 10 reps  - Supine Hip Adduction Isometric with Ball  - 1 x daily - 7 x weekly - 2 sets - 10 reps  - Hooklying Clamshell with Resistance  - 1 x daily - 7 x weekly - 2 sets - 10 reps    ASSESSMENT:   Patient tolerated interventions to promote improved spinal stability and function. Increased nustep to level 2, discussed starting home walking program to begin 10 min a day for the first week to " promote improved cardiovascular endurance    Post session pain: sore     PLAN:  Continue with current POC; update HEP next visit, add heel raises    OP PT PLAN:  Treatment/Interventions: Education/Instruction , Gait training , Manual Therapy  , Neuromuscular re-education , Self care/home management , Therapeutic activities , and Therapeutic exercise    PT Plan: Skilled PT   PT Frequency: 1-2 times per week  Duration:6 weeks  Visits Approved: [place]  Rehab Potential: Good  Plan of Care Agreement: Patient         Goals:   SHORT TERM GOALS:  Pt will report decrease in pain from 10/10 to </= 5/10 to improve quality of life. -PROGRESSING  Pt will demonstrate sit to stand transfer x10 without UE pain free in order to demonstrate improved LE strength -PROGRESSING  Pt will demonstrate painfree lumbar AROM in all planes in order to improve mobility.-PROGRESSING     LONG TERM GOALS:  Pt will report decrease in pain from 10/10 to </= 3/10 to improve quality of life.-PROGRESSING  Pt will perform reciprocal stair negotiation with UE support to demonstrate improved LE strength -PROGRESSING  Pt will ambulate for >30 minutes pain free to promote return to PLOF and improved vascularity. -PROGRESSING  Pt will improve score on Modified Oswestry from 46% to </= 23% impairment reflect improvement in functional mobility. -PROGRESSING

## 2023-12-07 ENCOUNTER — APPOINTMENT (OUTPATIENT)
Dept: PHYSICAL THERAPY | Facility: CLINIC | Age: 78
End: 2023-12-07
Payer: MEDICARE

## 2023-12-07 ENCOUNTER — OFFICE VISIT (OUTPATIENT)
Dept: UROLOGY | Facility: CLINIC | Age: 78
End: 2023-12-07
Payer: MEDICARE

## 2023-12-07 DIAGNOSIS — N39.3 SUI (STRESS URINARY INCONTINENCE, FEMALE): Primary | ICD-10-CM

## 2023-12-07 DIAGNOSIS — N32.81 OAB (OVERACTIVE BLADDER): ICD-10-CM

## 2023-12-07 PROCEDURE — 99213 OFFICE O/P EST LOW 20 MIN: CPT | Performed by: STUDENT IN AN ORGANIZED HEALTH CARE EDUCATION/TRAINING PROGRAM

## 2023-12-07 PROCEDURE — 1125F AMNT PAIN NOTED PAIN PRSNT: CPT | Performed by: STUDENT IN AN ORGANIZED HEALTH CARE EDUCATION/TRAINING PROGRAM

## 2023-12-07 NOTE — PROGRESS NOTES
"CHIEF COMPLAINT: FUV             HISTORY OF PRESENT ILLNESS:  This is a 78 y.o. female,  who presents with a follow up visit. Patient is doing well. Patient has been taking Myrbetriq for her bladder symptoms and has been using trial samples.              HISTORY OF PRESENT ILLNESS:           Past Medical History  She has a past medical history of Allergy status to unspecified drugs, medicaments and biological substances (03/12/2015), Depression, unspecified (03/12/2015), Osteoarthritis of knee, unspecified, Personal history of malignant neoplasm, unspecified, Personal history of other diseases of the circulatory system (01/30/2015), Personal history of other diseases of the digestive system (01/30/2015), Personal history of pulmonary embolism (01/30/2015), Pneumonia, unspecified organism (03/12/2015), and Primary osteoarthritis, unspecified shoulder (03/12/2015).    Surgical History  She has a past surgical history that includes Other surgical history (10/12/2021); Other surgical history (10/12/2021); Other surgical history (10/12/2021); Other surgical history (06/03/2021); and Other surgical history (06/03/2021).     Social History  She has no history on file for tobacco use, alcohol use, and drug use.    Family History  No family history on file.     Allergies  Erythromycin, Adhesive tape-silicones, Risperidone, and Iodinated contrast media      A comprehensive 10+ review of systems was negative except for: see hpi                          PHYSICAL EXAMINATION:  BP Readings from Last 3 Encounters:   12/27/22 138/77   10/28/22 125/65   10/17/22 163/84      Wt Readings from Last 3 Encounters:   05/03/23 86.2 kg (190 lb)   12/27/22 84.9 kg (187 lb 2 oz)   10/28/22 83.9 kg (185 lb)      BMI: Estimated body mass index is 33.13 kg/m² as calculated from the following:    Height as of 5/3/23: 1.613 m (5' 3.5\").    Weight as of 5/3/23: 86.2 kg (190 lb).  BSA: Estimated body surface area is 1.97 meters squared as calculated " "from the following:    Height as of 5/3/23: 1.613 m (5' 3.5\").    Weight as of 5/3/23: 86.2 kg (190 lb).  HEENT: Normocephalic, atraumatic, PER EOMI, nonicteric, trachea normal, thyroid normal, oropharynx normal.  CARDIAC: regular rate & rhythm, S1 & S2 normal.  No heaves, thrills, gallops or murmurs.  LUNGS: Clear to auscultation, no spinal or CV tenderness.  EXTREMITIES: No evidence of cyanosis, clubbing or edema.                     Assessment:    77 yo with OAB and KONSTANTIN      OAB:   moderate response with Myrbetriq, wants to continue for 8 weeks   Continue Myrbetriq about 80% improved   Doing well  Continue this   Follow up in 3 months with Lorri    Stress incontinence:  currently not an issue       Jese Ribera MD  "

## 2023-12-08 ENCOUNTER — TREATMENT (OUTPATIENT)
Dept: PHYSICAL THERAPY | Facility: CLINIC | Age: 78
End: 2023-12-08
Payer: MEDICARE

## 2023-12-08 DIAGNOSIS — G89.29 CHRONIC LOW BACK PAIN: ICD-10-CM

## 2023-12-08 DIAGNOSIS — M54.50 CHRONIC LOW BACK PAIN: ICD-10-CM

## 2023-12-08 PROCEDURE — 97110 THERAPEUTIC EXERCISES: CPT | Mod: GP

## 2023-12-08 NOTE — PROGRESS NOTES
"Physical Therapy    Physical Therapy Treatment    Patient Name: Margaret Thomas  MRN: [id number]  Today's Date: 12/8/2023    Time Calculation  Start Time: 1047  Stop Time: 1128  Time Calculation (min): 41 min    Visit #: 4 out of 12  Evaluation date: 11/20/23    Current Problem:   1. Chronic low back pain  Follow Up In Physical Therapy          SUBJECTIVE:   Patient states that her legs are giving out on her sooner than 6 minutes, calves get sore and worries if she pushes it will fall. Patient reports that she is good not great. Patient only feels soreness in calves and legs at this date. Patient notes that she felt weakness in legs after last tx session. Patient reports R LBP at start of session.      Precautions: STEADI score: 9     Pain:   Start of session: 5/10       OBJECTIVE:      Treatments:  Therapeutic Exercise: (41 minutes)  NuStep L3 x10 mins  H/L TrA brace 2x15  Bridge + TrA brace 3x10  H/L TrA brace + alt march x20  Sit to stand from mat table 2x10   Side steps along [employer] balance beam (SA support) x10  4\" step up forward/lateral step up 2x10 ea. (SA support)  Standing hip EXT/ABD 2x10 (SA support) ea.   Standing DL heel raise 3x10    Manual Therapy: ( minutes)    Gait Training: ( minutes)    Neuromuscular Re-education: ( minutes)    Therapeutic Activity: ( minutes)       HEP:  Access Code: [email or web address]  URL: [email or web address]  Date: 11/20/2023  Prepared by: Brooke     Exercises  - Supine Transversus Abdominis Bracing - Hands on Stomach  - 1 x daily - 7 x weekly - 2 sets - 10 reps  - Hooklying Gluteal Sets  - 1 x daily - 7 x weekly - 2 sets - 10 reps  - Supine Hip Adduction Isometric with Ball  - 1 x daily - 7 x weekly - 2 sets - 10 reps  - Hooklying Clamshell with Resistance  - 1 x daily - 7 x weekly - 2 sets - 10 reps    ASSESSMENT:   Patient encouraged to continue with walking program within tolerance. Patient was able to complete NuStep at increased resistance compared to last visit. Patient " progressing well through POC improving strength and muscular endurance.     Post session pain:      PLAN:  Continue with current POC; update HEP next visit, add heel raises    OP PT PLAN:  Treatment/Interventions: Education/Instruction , Gait training , Manual Therapy  , Neuromuscular re-education , Self care/home management , Therapeutic activities , and Therapeutic exercise    PT Plan: Skilled PT   PT Frequency: 1-2 times per week  Duration:6 weeks  Visits Approved: [place]  Rehab Potential: Good  Plan of Care Agreement: Patient         Goals:   SHORT TERM GOALS:  Pt will report decrease in pain from 10/10 to </= 5/10 to improve quality of life. -PROGRESSING  Pt will demonstrate sit to stand transfer x10 without UE pain free in order to demonstrate improved LE strength -MET  Pt will demonstrate painfree lumbar AROM in all planes in order to improve mobility.-PROGRESSING     LONG TERM GOALS:  Pt will report decrease in pain from 10/10 to </= 3/10 to improve quality of life.-PROGRESSING  Pt will perform reciprocal stair negotiation with UE support to demonstrate improved LE strength -PROGRESSING  Pt will ambulate for >30 minutes pain free to promote return to PLOF and improved vascularity. -PROGRESSING  Pt will improve score on Modified Oswestry from 46% to </= 23% impairment reflect improvement in functional mobility. -PROGRESSING

## 2023-12-12 ENCOUNTER — TREATMENT (OUTPATIENT)
Dept: PHYSICAL THERAPY | Facility: CLINIC | Age: 78
End: 2023-12-12
Payer: MEDICARE

## 2023-12-12 DIAGNOSIS — M54.50 CHRONIC LOW BACK PAIN: ICD-10-CM

## 2023-12-12 DIAGNOSIS — G89.29 CHRONIC LOW BACK PAIN: ICD-10-CM

## 2023-12-12 PROCEDURE — 97110 THERAPEUTIC EXERCISES: CPT | Mod: GP,CQ

## 2023-12-12 NOTE — PROGRESS NOTES
"Physical Therapy    Physical Therapy Treatment    Patient Name: Margaret Thomas  MRN: [id number]  Today's Date: 12/12/2023    Time Calculation  Start Time: 1102  Stop Time: 1145  Time Calculation (min): 43 min    Visit #: 5out of 12  Evaluation date: 11/20/23    Current Problem:   1. Chronic low back pain  Follow Up In Physical Therapy          SUBJECTIVE:   Pt continues to voice being able to walk approximately 6 min before taking rest break(goes to [facility] to walk vs mall because parking is better), Mild calf soreness lingering from last visit, Process of moving  so she is very busy at home getting stuff done     Precautions: STEADI score: 9     Pain:   Start of session: 5/10       OBJECTIVE:      Treatments:  Therapeutic Exercise: (43 minutes)  NuStep L3 x10 mins  Standing DL heel raises without UE support 10 x 2  Standing DL toe raises with intermittent UE support as needed 10 x 2  Standing lateral lunge L/R 10 x 2   Single leg Bridge L/R 10 x 1  H/L TrA brace + alt march x20  Sit to stand from mat table 12x 2  4\" step up forward/lateral step up 10 x 2 ea. (SA support)  Standing hip EXT 10 x 2, SA support   Seated single arm row 2# (floor to shl height 1 x 10 L/R       HEP:  Access Code: [email or web address]  URL: [email or web address]  Date: 11/20/2023  Prepared by: Brooke     Exercises  - Supine Transversus Abdominis Bracing - Hands on Stomach  - 1 x daily - 7 x weekly - 2 sets - 10 reps  - Hooklying Gluteal Sets  - 1 x daily - 7 x weekly - 2 sets - 10 reps  - Supine Hip Adduction Isometric with Ball  - 1 x daily - 7 x weekly - 2 sets - 10 reps  - Hooklying Clamshell with Resistance  - 1 x daily - 7 x weekly - 2 sets - 10 reps    ASSESSMENT:   Patient encouraged to continue with indoor walking program . Patient progressing well through POC improving strength and muscular endurance.     Post session pain: denies change     PLAN:  Continue with current POC;    OP PT PLAN:  Treatment/Interventions: " Education/Instruction , Gait training , Manual Therapy  , Neuromuscular re-education , Self care/home management , Therapeutic activities , and Therapeutic exercise    PT Plan: Skilled PT   PT Frequency: 1-2 times per week  Duration:6 weeks  Visits Approved: [place]  Rehab Potential: Good  Plan of Care Agreement: Patient         Goals:   SHORT TERM GOALS:  Pt will report decrease in pain from 10/10 to </= 5/10 to improve quality of life. -PROGRESSING  Pt will demonstrate sit to stand transfer x10 without UE pain free in order to demonstrate improved LE strength -MET  Pt will demonstrate painfree lumbar AROM in all planes in order to improve mobility.-PROGRESSING     LONG TERM GOALS:  Pt will report decrease in pain from 10/10 to </= 3/10 to improve quality of life.-PROGRESSING  Pt will perform reciprocal stair negotiation with UE support to demonstrate improved LE strength -PROGRESSING  Pt will ambulate for >30 minutes pain free to promote return to PLOF and improved vascularity. -PROGRESSING  Pt will improve score on Modified Oswestry from 46% to </= 23% impairment reflect improvement in functional mobility. -PROGRESSING

## 2023-12-14 ENCOUNTER — APPOINTMENT (OUTPATIENT)
Dept: PHYSICAL THERAPY | Facility: CLINIC | Age: 78
End: 2023-12-14
Payer: MEDICARE

## 2023-12-14 ENCOUNTER — DOCUMENTATION (OUTPATIENT)
Dept: PHYSICAL THERAPY | Facility: CLINIC | Age: 78
End: 2023-12-14

## 2023-12-14 NOTE — PROGRESS NOTES
Physical Therapy                 Therapy Communication Note    Patient Name: Margaret Thomas  MRN: [id number]  Today's Date: 12/14/2023     Discipline: Physical Therapy    Missed Visit Reason:  cut hand, rescheduled to monday    Missed Time: Cancel    Comment:

## 2023-12-18 ENCOUNTER — TREATMENT (OUTPATIENT)
Dept: PHYSICAL THERAPY | Facility: CLINIC | Age: 78
End: 2023-12-18
Payer: MEDICARE

## 2023-12-18 DIAGNOSIS — G89.29 CHRONIC LOW BACK PAIN: ICD-10-CM

## 2023-12-18 DIAGNOSIS — M54.50 CHRONIC LOW BACK PAIN: ICD-10-CM

## 2023-12-18 PROCEDURE — 97110 THERAPEUTIC EXERCISES: CPT | Mod: GP,CQ

## 2023-12-18 NOTE — PROGRESS NOTES
Physical Therapy    Physical Therapy Treatment    Patient Name: Margaret Thomas  MRN: [id number]  Today's Date: 12/18/2023    Time Calculation  Start Time: 1150  Stop Time: 1235  Time Calculation (min): 45 min    Visit #: 6 out of 12  Evaluation date: 11/20/23    Current Problem:   1. Chronic low back pain  Follow Up In Physical Therapy          SUBJECTIVE:   Pt remarks to have about the same pain levels, though she is doing better overall.  She just went thru a move yesterday but doesn't seem to have affected herself adversely.     Precautions: STEADI score: 9     Pain:   Start of session: 5/10       OBJECTIVE:      Treatments:  Therapeutic Exercise: ( minutes)  NuStep L3 x10 mins  AIREX: sidesteps x10 laps  -step taps w/ lateral movement  -straddle alt step taps (fwd/retro)  Staggered cable row 60# x20 ea  Std bridge x20  H/L ab brace and hip abd vs green and orange tbands 2x20       HEP:  Access Code: [email or web address]  URL: [email or web address]  Date: 11/20/2023  Prepared by: Brooke     Exercises  - Supine Transversus Abdominis Bracing - Hands on Stomach  - 1 x daily - 7 x weekly - 2 sets - 10 reps  - Hooklying Gluteal Sets  - 1 x daily - 7 x weekly - 2 sets - 10 reps  - Supine Hip Adduction Isometric with Ball  - 1 x daily - 7 x weekly - 2 sets - 10 reps  - Hooklying Clamshell with Resistance  - 1 x daily - 7 x weekly - 2 sets - 10 reps    ASSESSMENT:   Pt handled protocol well as she is able to demo a base or foundation of LE and core strength to help sustain translation into balance function; though she understands the vulnerability to sx if not careful how force translates at any given time of variable output.    Post session pain: denies change     PLAN:  Continue with current POC;    OP PT PLAN:  Treatment/Interventions: Education/Instruction , Gait training , Manual Therapy  , Neuromuscular re-education , Self care/home management , Therapeutic activities , and Therapeutic exercise    PT Plan: Skilled PT    PT Frequency: 1-2 times per week  Duration:6 weeks  Visits Approved: MN  Rehab Potential: Good  Plan of Care Agreement: Patient         Goals:   SHORT TERM GOALS:  Pt will report decrease in pain from 10/10 to </= 5/10 to improve quality of life. -PROGRESSING  Pt will demonstrate sit to stand transfer x10 without UE pain free in order to demonstrate improved LE strength -MET  Pt will demonstrate painfree lumbar AROM in all planes in order to improve mobility.-PROGRESSING     LONG TERM GOALS:  Pt will report decrease in pain from 10/10 to </= 3/10 to improve quality of life.-PROGRESSING  Pt will perform reciprocal stair negotiation with UE support to demonstrate improved LE strength -PROGRESSING  Pt will ambulate for >30 minutes pain free to promote return to PLOF and improved vascularity. -PROGRESSING  Pt will improve score on Modified Oswestry from 46% to </= 23% impairment reflect improvement in functional mobility. -PROGRESSING

## 2023-12-21 ENCOUNTER — TREATMENT (OUTPATIENT)
Dept: PHYSICAL THERAPY | Facility: CLINIC | Age: 78
End: 2023-12-21
Payer: MEDICARE

## 2023-12-21 DIAGNOSIS — M54.50 CHRONIC LOW BACK PAIN: ICD-10-CM

## 2023-12-21 DIAGNOSIS — G89.29 CHRONIC LOW BACK PAIN: ICD-10-CM

## 2023-12-21 PROCEDURE — 97110 THERAPEUTIC EXERCISES: CPT | Mod: GP,CQ

## 2023-12-21 NOTE — PROGRESS NOTES
"Physical Therapy    Physical Therapy Treatment    Patient Name: Margaret Thomas  MRN: [id number]  Today's Date: 12/21/2023    Time Calculation  Start Time: 1150  Stop Time: 1230  Time Calculation (min): 40 min    Visit #: 7 out of 12  Evaluation date: 11/20/23    Current Problem:   1. Chronic low back pain  Follow Up In Physical Therapy          SUBJECTIVE:   Pt remarks Her back is feeling better, \"its more annoying vs pain\", Pain has consistently remained lower then 5/10, Able to negotiate reciprocal stairs unless at her sisters house who has steep steps     Precautions: STEADI score: 9     Pain:   Start of session:  2/10     OBJECTIVE:    Standing trunk side bending WFL \"feels tight\"  Treatments:  Therapeutic Exercise: (40 minutes)  NuStep L3 x10 mins  Standing lateral lunge R /L 10 x 2 each   AIREX: sidesteps x10 laps  -step taps w/ lateral movement  -straddle alt step taps (fwd/retro)  Staggered cable row 60# 10 x 2 each   Sit to stand from mat table 15 x 2  Std bridge x20  H/L ab brace and hip abd vs green and orange tbands 2x20  H/L trunk rotation 5 sec hold L/Rx 10       HEP:  Access Code: [email or web address]  URL: [email or web address]  Date: 11/20/2023  Prepared by: Brooke     Exercises  - Supine Transversus Abdominis Bracing - Hands on Stomach  - 1 x daily - 7 x weekly - 2 sets - 10 reps  - Hooklying Gluteal Sets  - 1 x daily - 7 x weekly - 2 sets - 10 reps  - Supine Hip Adduction Isometric with Ball  - 1 x daily - 7 x weekly - 2 sets - 10 reps  - Hooklying Clamshell with Resistance  - 1 x daily - 7 x weekly - 2 sets - 10 reps    ASSESSMENT:   Improving ability to perform exercises that challenge her balance without use of UE. All STG achieved at this time, Continue to progress toward achieving LTG's    Post session pain: denies change     PLAN:  Continue with current POC;    OP PT PLAN:  Treatment/Interventions: Education/Instruction , Gait training , Manual Therapy  , Neuromuscular re-education , Self " care/home management , Therapeutic activities , and Therapeutic exercise    PT Plan: Skilled PT   PT Frequency: 1-2 times per week  Duration:6 weeks  Visits Approved: MN  Rehab Potential: Good  Plan of Care Agreement: Patient         Goals:   SHORT TERM GOALS:  Pt will report decrease in pain from 10/10 to </= 5/10 to improve quality of life. MET  Pt will demonstrate sit to stand transfer x10 without UE pain free in order to demonstrate improved LE strength -MET  Pt will demonstrate painfree lumbar AROM in all planes in order to improve mobility.-MET     LONG TERM GOALS:  Pt will report decrease in pain from 10/10 to </= 3/10 to improve quality of life.-PROGRESSING  Pt will perform reciprocal stair negotiation with UE support to demonstrate improved LE strength -MET  Pt will ambulate for >30 minutes pain free to promote return to PLOF and improved vascularity. -PROGRESSING  Pt will improve score on Modified Oswestry from 46% to </= 23% impairment reflect improvement in functional mobility. -PROGRESSING

## 2023-12-26 ENCOUNTER — TREATMENT (OUTPATIENT)
Dept: PHYSICAL THERAPY | Facility: CLINIC | Age: 78
End: 2023-12-26
Payer: MEDICARE

## 2023-12-26 DIAGNOSIS — M54.50 CHRONIC LOW BACK PAIN: ICD-10-CM

## 2023-12-26 DIAGNOSIS — G89.29 CHRONIC LOW BACK PAIN: ICD-10-CM

## 2023-12-26 PROCEDURE — 97110 THERAPEUTIC EXERCISES: CPT | Mod: GP,CQ

## 2023-12-28 ENCOUNTER — TREATMENT (OUTPATIENT)
Dept: PHYSICAL THERAPY | Facility: CLINIC | Age: 78
End: 2023-12-28
Payer: MEDICARE

## 2023-12-28 ENCOUNTER — APPOINTMENT (OUTPATIENT)
Dept: PHYSICAL THERAPY | Facility: CLINIC | Age: 78
End: 2023-12-28
Payer: MEDICARE

## 2023-12-28 DIAGNOSIS — M54.50 CHRONIC LOW BACK PAIN: ICD-10-CM

## 2023-12-28 DIAGNOSIS — G89.29 CHRONIC LOW BACK PAIN: ICD-10-CM

## 2023-12-28 PROCEDURE — 97110 THERAPEUTIC EXERCISES: CPT | Mod: GP

## 2023-12-28 NOTE — PROGRESS NOTES
"Physical Therapy    Physical Therapy Treatment  PROGRESS NOTE    Patient Name: Margaret Thomas  MRN: [id number]  Today's Date: 12/28/2023    Time Calculation  Start Time: 0931  Stop Time: 1016  Time Calculation (min): 45 min    Visit #:  9 out of 12  Evaluation date: 11/20/23    Current Problem:   1. Chronic low back pain  Follow Up In Physical Therapy          SUBJECTIVE:   Patient reports that back has been pinching on her when she is sitting or when she is getting up, feels tender. Patient reports that the season is not helping.      Precautions: STEADI score: 9     Pain:   Start of session:  3/10     OBJECTIVE:    Modified Oswestry: 14/50= 28%     Treatments:  Therapeutic Exercise: (45 minutes)  NuStep L4 x10 mins  Bridge + TrA brace 2x15   Clam shell 2x15 ea.   H/L lumbar rotation + opposite arm elevation x20   Standing at bar (single UE support):   Hip ABD 2x10   Hip EXT 2x10  6\" forward step up x15 ea. (SA support)     HEP:  Access Code: [email or web address]  URL: [email or web address]  Date: 11/20/2023  Prepared by: Brooke     Exercises  - Supine Transversus Abdominis Bracing - Hands on Stomach  - 1 x daily - 7 x weekly - 2 sets - 10 reps  - Hooklying Gluteal Sets  - 1 x daily - 7 x weekly - 2 sets - 10 reps  - Supine Hip Adduction Isometric with Ball  - 1 x daily - 7 x weekly - 2 sets - 10 reps  - Hooklying Clamshell with Resistance  - 1 x daily - 7 x weekly - 2 sets - 10 reps    ASSESSMENT:   Patient has made good progress through POC. Encouraged to continue walking program to improve blood flow and circulation. Patient has met all short term goals at this date and continues to progress towards long term goals.  Patient improved Modified Oswestry score compared to eval demonstrating improve functional mobility. Patient encouraged to continue HEP to promote self management of condition.     Post session pain: tired     PLAN:  Continue with current POC;    OP PT PLAN:  Treatment/Interventions: " Education/Instruction , Gait training , Manual Therapy  , Neuromuscular re-education , Self care/home management , Therapeutic activities , and Therapeutic exercise    PT Plan: Skilled PT   PT Frequency: 1-2 times per week  Duration:6 weeks  Visits Approved: MN  Rehab Potential: Good  Plan of Care Agreement: Patient         Goals:   SHORT TERM GOALS:  Pt will report decrease in pain from 10/10 to </= 5/10 to improve quality of life. MET  Pt will demonstrate sit to stand transfer x10 without UE pain free in order to demonstrate improved LE strength -MET  Pt will demonstrate painfree lumbar AROM in all planes in order to improve mobility.-MET     LONG TERM GOALS:  Pt will report decrease in pain from 10/10 to </= 3/10 to improve quality of life.-PROGRESSING  Pt will perform reciprocal stair negotiation with UE support to demonstrate improved LE strength -MET  Pt will ambulate for >30 minutes pain free to promote return to PLOF and improved vascularity. -PROGRESSING (up to 10 minutes)  Pt will improve score on Modified Oswestry from 46% to </= 23% impairment reflect improvement in functional mobility. -PROGRESSING (28%)   90

## 2024-01-02 ENCOUNTER — TREATMENT (OUTPATIENT)
Dept: PHYSICAL THERAPY | Facility: CLINIC | Age: 79
End: 2024-01-02
Payer: MEDICARE

## 2024-01-02 DIAGNOSIS — M54.50 CHRONIC LOW BACK PAIN: ICD-10-CM

## 2024-01-02 DIAGNOSIS — G89.29 CHRONIC LOW BACK PAIN: ICD-10-CM

## 2024-01-02 PROCEDURE — 97110 THERAPEUTIC EXERCISES: CPT | Mod: GP

## 2024-01-02 NOTE — PROGRESS NOTES
"Physical Therapy    Physical Therapy Treatment  PROGRESS NOTE    Patient Name: Margaret Thomas  MRN: [id number]  Today's Date: 1/2/2024    Time Calculation  Start Time: 1102  Stop Time: 1147  Time Calculation (min): 45 min    Visit #:  10 out of 12  Evaluation date: 11/20/23    Current Problem:   1. Chronic low back pain  Follow Up In Physical Therapy          SUBJECTIVE:   Patient reports that she is doing pretty good but when she moves gets \"pinching\" still demonstrates lateral flexion. Patient reports that she got some unorganized walking.      Precautions: STEADI score: 9     Pain:   Start of session:  3/10     OBJECTIVE:    Modified Oswestry: 14/50= 28%     Treatments:  Therapeutic Exercise: (45 minutes)  Recumbent bike L3 x10 mins  Bridge + TrA brace 2x15   Clam shell (green TB) 2x15 ea.   H/L lumbar rotation + opposite arm elevation 3\"x20   Figure 4 piriformis stretch 3x20 sec.   Side steps for 25ft x6   Slomo march for 25ft x6   6\" forward/lateral step up x15 ea. (No <>SA support)     HEP:  Access Code: [email or web address]  URL: [email or web address]  Date: 11/20/2023  Prepared by: Brooke     Exercises  - Supine Transversus Abdominis Bracing - Hands on Stomach  - 1 x daily - 7 x weekly - 2 sets - 10 reps  - Hooklying Gluteal Sets  - 1 x daily - 7 x weekly - 2 sets - 10 reps  - Supine Hip Adduction Isometric with Ball  - 1 x daily - 7 x weekly - 2 sets - 10 reps  - Hooklying Clamshell with Resistance  - 1 x daily - 7 x weekly - 2 sets - 10 reps    ASSESSMENT:   Patient was able to improve strengthening with increased resistance during interventions. Patient tolerated interventions without increase in pain. Patient required rest breaks during treatment session secondary to fatigue. Patient encouraged to perform walking program as able.    Post session pain:tired     PLAN:  Continue with current POC;    OP PT PLAN:  Treatment/Interventions: Education/Instruction , Gait training , Manual Therapy  , Neuromuscular " re-education , Self care/home management , Therapeutic activities , and Therapeutic exercise    PT Plan: Skilled PT   PT Frequency: 1-2 times per week  Duration:6 weeks  Visits Approved: MN  Rehab Potential: Good  Plan of Care Agreement: Patient         Goals:   SHORT TERM GOALS:  Pt will report decrease in pain from 10/10 to </= 5/10 to improve quality of life. MET  Pt will demonstrate sit to stand transfer x10 without UE pain free in order to demonstrate improved LE strength -MET  Pt will demonstrate painfree lumbar AROM in all planes in order to improve mobility.-MET     LONG TERM GOALS:  Pt will report decrease in pain from 10/10 to </= 3/10 to improve quality of life.-PROGRESSING  Pt will perform reciprocal stair negotiation with UE support to demonstrate improved LE strength -MET  Pt will ambulate for >30 minutes pain free to promote return to PLOF and improved vascularity. -PROGRESSING (up to 10 minutes)  Pt will improve score on Modified Oswestry from 46% to </= 23% impairment reflect improvement in functional mobility. -PROGRESSING (28%)

## 2024-01-04 ENCOUNTER — APPOINTMENT (OUTPATIENT)
Dept: PHYSICAL THERAPY | Facility: CLINIC | Age: 79
End: 2024-01-04
Payer: MEDICARE

## 2024-02-06 ENCOUNTER — DOCUMENTATION (OUTPATIENT)
Dept: PHYSICAL THERAPY | Facility: CLINIC | Age: 79
End: 2024-02-06
Payer: MEDICARE

## 2024-02-06 NOTE — PROGRESS NOTES
Physical Therapy    Discharge Summary    Patient Name: Margaret Thomas  : 1945  MRN: [id number]  Today's Date: 2024    Referring Provider: Becka Ramirez   Medical Diagnosis: M54.50,G89.29 (ICD-10-CM) - Chronic low back pain   Therapy Diagnosis: Chronic low back pain    Discharge Summary: PT    Evaluation Date: 23  Visit total to date: 10  Date of Last Visit: 24    Therapy Summary: Patient has met all short term goals at this date and continues to progress towards long term goals. Patient improved Modified Oswestry score compared to eval demonstrating improve functional mobility. Patient encouraged to continue HEP to promote self management of condition. Patient failed to return for further follow up visits.     SHORT TERM GOALS:  Pt will report decrease in pain from 10/10 to </= 5/10 to improve quality of life. MET  Pt will demonstrate sit to stand transfer x10 without UE pain free in order to demonstrate improved LE strength -MET  Pt will demonstrate painfree lumbar AROM in all planes in order to improve mobility.-MET     LONG TERM GOALS:  Pt will report decrease in pain from 10/10 to </= 3/10 to improve quality of life.-PROGRESSING  Pt will perform reciprocal stair negotiation with UE support to demonstrate improved LE strength -MET  Pt will ambulate for >30 minutes pain free to promote return to PLOF and improved vascularity. -PROGRESSING (up to 10 minutes)  Pt will improve score on Modified Oswestry from 46% to </= 23% impairment reflect improvement in functional mobility. -PROGRESSING (28%)    Rehab Discharge Reason: Achieved all and/or the most significant goals(s)

## 2024-02-07 ENCOUNTER — HOSPITAL ENCOUNTER (OUTPATIENT)
Dept: RADIOLOGY | Facility: CLINIC | Age: 79
Discharge: HOME | End: 2024-02-07
Payer: MEDICARE

## 2024-02-07 DIAGNOSIS — R05.1 ACUTE COUGH: ICD-10-CM

## 2024-02-07 PROCEDURE — 71046 X-RAY EXAM CHEST 2 VIEWS: CPT | Performed by: RADIOLOGY

## 2024-02-07 PROCEDURE — 71046 X-RAY EXAM CHEST 2 VIEWS: CPT

## 2024-03-14 ENCOUNTER — OFFICE VISIT (OUTPATIENT)
Dept: UROLOGY | Facility: CLINIC | Age: 79
End: 2024-03-14
Payer: MEDICARE

## 2024-03-14 DIAGNOSIS — N39.3 SUI (STRESS URINARY INCONTINENCE, FEMALE): ICD-10-CM

## 2024-03-14 DIAGNOSIS — N32.81 OAB (OVERACTIVE BLADDER): Primary | ICD-10-CM

## 2024-03-14 PROCEDURE — 1160F RVW MEDS BY RX/DR IN RCRD: CPT

## 2024-03-14 PROCEDURE — 99204 OFFICE O/P NEW MOD 45 MIN: CPT

## 2024-03-14 PROCEDURE — 1159F MED LIST DOCD IN RCRD: CPT

## 2024-03-14 RX ORDER — MIRABEGRON 50 MG/1
50 TABLET, EXTENDED RELEASE ORAL DAILY
Qty: 84 TABLET | Refills: 0 | COMMUNITY
Start: 2024-03-14 | End: 2024-06-06

## 2024-03-14 NOTE — PROGRESS NOTES
[facility]  Outpatient Clinic Note    Patient: Margaret Thomas  Age/Sex: 78 y.o., female  MRN: [id number]    Chief Complaint:  3 month medication follow up          History of Present Illness  This is a 78 y.o. female, who presents for follow-up for 3 month medication check up. Patient has been taking Myrbetriq for her bladder symptoms and has been using trial samples.  She reports the medication has decreased her symptoms of  urinary urgency and frequency. She stated she is about 90% better with the medication and would like to continue on this treatment. She denies dysuria, flank pain, pelvic pressure, nausea, vomiting, fever or chills.              Past Medical & Surgical History  Past Medical History:   Diagnosis Date    Allergy status to unspecified drugs, medicaments and biological substances 03/12/2015    History of seasonal allergies    Depression, unspecified 03/12/2015    Chronic depression    Osteoarthritis of knee, unspecified     Osteoarthritis of knee    Personal history of malignant neoplasm, unspecified     History of malignant neoplasm    Personal history of other diseases of the circulatory system 01/30/2015    History of hypertension    Personal history of other diseases of the digestive system 01/30/2015    History of gastroesophageal reflux (GERD)    Personal history of pulmonary embolism 01/30/2015    History of pulmonary embolism    Pneumonia, unspecified organism 03/12/2015    Pneumonia    Primary osteoarthritis, unspecified shoulder 03/12/2015    Osteoarthritis of shoulder     Past Surgical History:   Procedure Laterality Date    OTHER SURGICAL HISTORY  10/12/2021    Cataract surgery    OTHER SURGICAL HISTORY  10/12/2021    Skin lesion excision    OTHER SURGICAL HISTORY  10/12/2021    Hysterectomy    OTHER SURGICAL HISTORY  06/03/2021    Knee replacement    OTHER SURGICAL HISTORY  06/03/2021    Sinus surgery       Family History  No family history on file.    Social History  She  has no history on file for tobacco use, alcohol use, and drug use.    Allergies  Erythromycin, Adhesive tape-silicones, Risperidone, and Iodinated contrast media    Medications:  Current Outpatient Medications on File Prior to Visit   Medication Sig Dispense Refill    acetaminophen (Tylenol) 325 mg tablet Take 2 tablets (650 mg) by mouth every 6 hours.      albuterol 90 mcg/actuation inhaler Inhale 2 puffs every 4 hours if needed for shortness of breath or wheezing.      ALPRAZolam (Xanax) 0.5 mg tablet Take by mouth.      amLODIPine (Norvasc) 10 mg tablet Take 1 tablet (10 mg) by mouth once daily.      ammonium lactate (Amlactin) 12 % cream 1 Application      amoxicillin-pot clavulanate (Augmentin) 875-125 mg tablet Take 1 tablet (875 mg) by mouth 2 times a day. For 7 days      atorvastatin (Lipitor) 80 mg tablet Take 1 tablet (80 mg) by mouth once daily.      azelastine (Astelin) 137 mcg (0.1 %) nasal spray Administer 2 sprays into each nostril 2 times a day.      benzonatate (Tessalon) 100 mg capsule Take 1 capsule (100 mg) by mouth 3 times a day as needed for cough.      betamethasone dipropionate 0.05 % cream 1 Application      busPIRone (Buspar) 5 mg tablet Take 1 tablet (5 mg) by mouth 3 times a day.      calcium carbonate-vitamin D3 (Caltrate with Vitamin D3) 600 mg-20 mcg (800 unit) tablet Take by mouth.      citalopram hydrobromide (CITALOPRAM ORAL) Take 1 tablet by mouth once daily.      docusate sodium (Colace) 100 mg capsule Take 1 capsule (100 mg) by mouth 2 times a day.      doxycycline (Vibramycin) 100 mg capsule Take 1 capsule (100 mg) by mouth 2 times a day.      famotidine (Pepcid) 40 mg tablet Take 1 tablet (40 mg) by mouth once daily.      Flowflex COVID-19 Ag Home Test kit USE AS DIRECTED      FLUoxetine (PROzac) 10 mg capsule Take by mouth.      FLUoxetine (PROzac) 40 mg capsule Take 1 capsule (40 mg) by mouth once daily.      fluticasone (Flonase) 50 mcg/actuation nasal spray Administer 2  sprays into each nostril once daily.      fluticasone (Flovent) 44 mcg/actuation inhaler Inhale 2 puffs 2 times a day.      gabapentin (Neurontin) 300 mg capsule Take 1 capsule (300 mg) by mouth 4 times a day.      hydroCHLOROthiazide (Microzide) 12.5 mg capsule Take 1 capsule (12.5 mg) by mouth once daily.      hydrocortisone 2.5 % cream 1 Application      losartan (Cozaar) 100 mg tablet Take 1 tablet (100 mg) by mouth once daily.      meloxicam (Mobic) 7.5 mg tablet Take 1 tablet (7.5 mg) by mouth once daily as needed.      mupirocin (Bactroban) 2 % ointment Apply topically 3 times a day. as directed      polyethylene glycol (Glycolax, Miralax) 17 gram/dose powder Take 17 g by mouth if needed.      polyethylene glycol 1450,bulk, powder USE AS DIRECTED.      predniSONE (Deltasone) 10 mg tablet 1 tablet (10 mg).      solifenacin (VESIcare) 10 mg tablet Take by mouth.      traMADol (Ultram) 50 mg tablet Take 1 tablet (50 mg) by mouth every 6 hours if needed.      triamcinolone (Kenalog) 0.1 % cream 1 Application      zolpidem CR (Ambien CR) 12.5 mg ER tablet Take 1 tablet (12.5 mg) by mouth once daily at bedtime.      zolpidem CR (Ambien CR) 6.25 mg ER tablet Take 1 tablet (6.25 mg) by mouth as needed at bedtime for sleep.       No current facility-administered medications on file prior to visit.        Review of Systems   A comprehensive 10+ review of systems was negative except for: see hpi          Physical Exam                                                                                                                      General: Well developed, well nourished, alert and cooperative, appears in no acute distress  Head: Normocephalic, atraumatic  Neck: supple, trachea midline  Eyes: Non-injected conjunctiva, sclera clear, no proptosis  Cardiac: Extremities are warm and well perfused. No edema, cyanosis or pallor.   Lungs: Breathing is easy, non-labored. Speaking in clear and complete sentences. Normal  diaphragmatic movement.  Abdomen: soft, non-distended, non-tender, no rebound or guarding, no hernia and no CVA tenderness   MSK: Ambulatory with steady gait, unassisted  Neuro: alert and oriented to person, place and time  Psych: Demonstrates good judgement and reason, without hallucinations, abnormal affect or abnormal behaviors.  Skin: no obvious lesions, no rashes      Labs  N/A    Imaging  N/A    IMPRESSION AND PLAN:  This is a 78 y.o. female, with OAB and KONSTANTIN      OAB:   -moderate response with Myrbetriq, wants to continue for 8 weeks   -Continue Myrbetriq about 90% improved   -Doing well, will continue on this treatment plan    Stress incontinence:  -currently not an issue     Follow up in 3 months     All questions and concerns were answered and addressed.  The patient expressed understanding and agrees with the plan.     Reviewed and approved by AL TILLEY on 3/14/24 at 10:26 AM.

## 2024-06-06 ENCOUNTER — TELEMEDICINE (OUTPATIENT)
Dept: UROLOGY | Facility: CLINIC | Age: 79
End: 2024-06-06
Payer: MEDICARE

## 2024-06-06 DIAGNOSIS — N39.3 SUI (STRESS URINARY INCONTINENCE, FEMALE): ICD-10-CM

## 2024-06-06 DIAGNOSIS — N32.81 OAB (OVERACTIVE BLADDER): Primary | ICD-10-CM

## 2024-06-06 PROCEDURE — 99214 OFFICE O/P EST MOD 30 MIN: CPT

## 2024-06-06 PROCEDURE — 1159F MED LIST DOCD IN RCRD: CPT

## 2024-06-06 PROCEDURE — 1160F RVW MEDS BY RX/DR IN RCRD: CPT

## 2024-06-06 RX ORDER — MIRABEGRON 50 MG/1
50 TABLET, EXTENDED RELEASE ORAL DAILY
Qty: 90 TABLET | Refills: 3 | Status: SHIPPED | OUTPATIENT
Start: 2024-06-06 | End: 2025-06-06

## 2024-06-06 NOTE — PROGRESS NOTES
[facility]  Outpatient Clinic Note    Patient: Margaret Thomas  Age/Sex: 78 y.o., female  MRN: [id number]  Virtual Visit: An interactive audio and video telecommunication system which permits real time communications between the patient (at the originating site) and provider (at the distant site) was utilized to provide this telehealth service. Verbal consent was requested and obtained from Margaret Thomas on this date 06/06/2024 for a telehealth visit.     Chief Complaint: 3-month follow-up         History of Present Illness  This is a 78 y.o. female, who presents virtually for follow-up for 3 month medication management appointment.  The patient stated she has been taking Myrbetriq for her OAB she is about 90% to 100% better with the medication.  The medication has decreased her symptoms of urinary urgency and frequency significantly she would like to continue on this medication. She denies dysuria, gross hematuria, flank pain, pelvic pressure, nausea, vomiting, fever or chills.            Past Medical & Surgical History  Past Medical History:   Diagnosis Date    Allergy status to unspecified drugs, medicaments and biological substances 03/12/2015    History of seasonal allergies    Depression, unspecified 03/12/2015    Chronic depression    Osteoarthritis of knee, unspecified     Osteoarthritis of knee    Personal history of malignant neoplasm, unspecified     History of malignant neoplasm    Personal history of other diseases of the circulatory system 01/30/2015    History of hypertension    Personal history of other diseases of the digestive system 01/30/2015    History of gastroesophageal reflux (GERD)    Personal history of pulmonary embolism 01/30/2015    History of pulmonary embolism    Pneumonia, unspecified organism 03/12/2015    Pneumonia    Primary osteoarthritis, unspecified shoulder 03/12/2015    Osteoarthritis of shoulder     Past Surgical History:   Procedure Laterality Date    OTHER  SURGICAL HISTORY  10/12/2021    Cataract surgery    OTHER SURGICAL HISTORY  10/12/2021    Skin lesion excision    OTHER SURGICAL HISTORY  10/12/2021    Hysterectomy    OTHER SURGICAL HISTORY  06/03/2021    Knee replacement    OTHER SURGICAL HISTORY  06/03/2021    Sinus surgery       Family History  No family history on file.    Social History  She has no history on file for tobacco use, alcohol use, and drug use.    Allergies  Erythromycin, Adhesive tape-silicones, Risperidone, and Iodinated contrast media    Medications:  Current Outpatient Medications on File Prior to Visit   Medication Sig Dispense Refill    acetaminophen (Tylenol) 325 mg tablet Take 2 tablets (650 mg) by mouth every 6 hours.      albuterol 90 mcg/actuation inhaler Inhale 2 puffs every 4 hours if needed for shortness of breath or wheezing.      ALPRAZolam (Xanax) 0.5 mg tablet Take by mouth.      amLODIPine (Norvasc) 10 mg tablet Take 1 tablet (10 mg) by mouth once daily.      ammonium lactate (Amlactin) 12 % cream 1 Application      amoxicillin-pot clavulanate (Augmentin) 875-125 mg tablet Take 1 tablet (875 mg) by mouth 2 times a day. For 7 days      atorvastatin (Lipitor) 80 mg tablet Take 1 tablet (80 mg) by mouth once daily.      azelastine (Astelin) 137 mcg (0.1 %) nasal spray Administer 2 sprays into each nostril 2 times a day.      benzonatate (Tessalon) 100 mg capsule Take 1 capsule (100 mg) by mouth 3 times a day as needed for cough.      betamethasone dipropionate 0.05 % cream 1 Application      busPIRone (Buspar) 5 mg tablet Take 1 tablet (5 mg) by mouth 3 times a day.      calcium carbonate-vitamin D3 (Caltrate with Vitamin D3) 600 mg-20 mcg (800 unit) tablet Take by mouth.      citalopram hydrobromide (CITALOPRAM ORAL) Take 1 tablet by mouth once daily.      docusate sodium (Colace) 100 mg capsule Take 1 capsule (100 mg) by mouth 2 times a day.      doxycycline (Vibramycin) 100 mg capsule Take 1 capsule (100 mg) by mouth 2 times a  day.      famotidine (Pepcid) 40 mg tablet Take 1 tablet (40 mg) by mouth once daily.      Flowflex COVID-19 Ag Home Test kit USE AS DIRECTED      FLUoxetine (PROzac) 10 mg capsule Take by mouth.      FLUoxetine (PROzac) 40 mg capsule Take 1 capsule (40 mg) by mouth once daily.      fluticasone (Flonase) 50 mcg/actuation nasal spray Administer 2 sprays into each nostril once daily.      fluticasone (Flovent) 44 mcg/actuation inhaler Inhale 2 puffs 2 times a day.      gabapentin (Neurontin) 300 mg capsule Take 1 capsule (300 mg) by mouth 4 times a day.      hydroCHLOROthiazide (Microzide) 12.5 mg capsule Take 1 capsule (12.5 mg) by mouth once daily.      hydrocortisone 2.5 % cream 1 Application      losartan (Cozaar) 100 mg tablet Take 1 tablet (100 mg) by mouth once daily.      meloxicam (Mobic) 7.5 mg tablet Take 1 tablet (7.5 mg) by mouth once daily as needed.      mirabegron (Myrbetriq) 50 mg tablet extended release 24 hr 24 hr tablet Take 1 tablet (50 mg) by mouth once daily. LOT [id number]  LOT 02/2026 84 tablet 0    mupirocin (Bactroban) 2 % ointment Apply topically 3 times a day. as directed      polyethylene glycol (Glycolax, Miralax) 17 gram/dose powder Take 17 g by mouth if needed.      polyethylene glycol 1450,bulk, powder USE AS DIRECTED.      predniSONE (Deltasone) 10 mg tablet 1 tablet (10 mg).      solifenacin (VESIcare) 10 mg tablet Take by mouth.      traMADol (Ultram) 50 mg tablet Take 1 tablet (50 mg) by mouth every 6 hours if needed.      triamcinolone (Kenalog) 0.1 % cream 1 Application      zolpidem CR (Ambien CR) 12.5 mg ER tablet Take 1 tablet (12.5 mg) by mouth once daily at bedtime.      zolpidem CR (Ambien CR) 6.25 mg ER tablet Take 1 tablet (6.25 mg) by mouth as needed at bedtime for sleep.       No current facility-administered medications on file prior to visit.        Review of Systems   A comprehensive 10+ review of systems was negative except for: see hpi          Physical Exam                                                                                                                       General: Well developed, well nourished, alert and cooperative, appears in no acute distress  Eyes: no proptosis  Lungs: Breathing is easy, non-labored while speaking in clear and complete sentences.   Neuro: alert and oriented to person, place and time  Psych: Demonstrates good judgement and reason, without hallucinations, abnormal affect or abnormal behaviors.  Skin: no obvious lesions, no rashes      Labs  N/A     Imaging  N/A     IMPRESSION AND PLAN:  Margaret Thomas is a 78 y.o. with OAB and KONSTANTIN      OAB:   -moderate response with Myrbetriq, wants to continue for 8 weeks   -Continue Myrbetriq about 90%-100% improved   -Doing well, will continue on this treatment plan  -Myrbetriq sent to her mail away pharmacy     Stress incontinence:  -currently not an issue, expectant management     Follow up in 3 months     All questions and concerns were answered and addressed.  The patient expressed understanding and agrees with the plan.     Reviewed and approved by AL TILLEY on 6/6/24 at 1:08 PM.

## 2024-08-30 ENCOUNTER — OFFICE VISIT (OUTPATIENT)
Dept: WOUND CARE | Facility: HOSPITAL | Age: 79
End: 2024-08-30
Payer: MEDICARE

## 2024-08-30 PROCEDURE — 99213 OFFICE O/P EST LOW 20 MIN: CPT | Mod: 25

## 2024-08-30 PROCEDURE — 11042 DBRDMT SUBQ TIS 1ST 20SQCM/<: CPT

## 2024-09-06 ENCOUNTER — OFFICE VISIT (OUTPATIENT)
Dept: WOUND CARE | Facility: HOSPITAL | Age: 79
End: 2024-09-06
Payer: MEDICARE

## 2024-09-06 PROCEDURE — 29580 STRAPPING UNNA BOOT: CPT | Mod: LT

## 2024-09-12 ENCOUNTER — APPOINTMENT (OUTPATIENT)
Dept: UROLOGY | Facility: CLINIC | Age: 79
End: 2024-09-12
Payer: MEDICARE

## 2024-09-12 VITALS — HEIGHT: 64 IN | BODY MASS INDEX: 34.15 KG/M2 | WEIGHT: 200 LBS

## 2024-09-12 DIAGNOSIS — N32.81 OAB (OVERACTIVE BLADDER): Primary | ICD-10-CM

## 2024-09-12 DIAGNOSIS — N39.3 SUI (STRESS URINARY INCONTINENCE, FEMALE): ICD-10-CM

## 2024-09-12 PROCEDURE — 1126F AMNT PAIN NOTED NONE PRSNT: CPT

## 2024-09-12 PROCEDURE — 1159F MED LIST DOCD IN RCRD: CPT

## 2024-09-12 PROCEDURE — 1160F RVW MEDS BY RX/DR IN RCRD: CPT

## 2024-09-12 PROCEDURE — 99214 OFFICE O/P EST MOD 30 MIN: CPT

## 2024-09-12 ASSESSMENT — PAIN SCALES - GENERAL: PAINLEVEL: 0-NO PAIN

## 2024-09-12 NOTE — PROGRESS NOTES
[facility]  Outpatient Clinic Note    Patient: Margaret Thomas  Age/Sex: 79 y.o., female  MRN: [id number]    Chief Complaint: 3-month follow-up         History of Present Illness  This is a 79 y.o. female, who presents for follow-up for location management.  The patient is currently taking Myrbetriq and states that she is 90% better with this medication has significantly decreased her symptoms of urinary urgency and frequency. She denies dysuria, gross hematuria, flank pain, pelvic pressure, nausea, vomiting, fever or chills.            Past Medical & Surgical History  Past Medical History:   Diagnosis Date    Allergy status to unspecified drugs, medicaments and biological substances 03/12/2015    History of seasonal allergies    Depression, unspecified 03/12/2015    Chronic depression    Osteoarthritis of knee, unspecified     Osteoarthritis of knee    Personal history of malignant neoplasm, unspecified     History of malignant neoplasm    Personal history of other diseases of the circulatory system 01/30/2015    History of hypertension    Personal history of other diseases of the digestive system 01/30/2015    History of gastroesophageal reflux (GERD)    Personal history of pulmonary embolism 01/30/2015    History of pulmonary embolism    Pneumonia, unspecified organism 03/12/2015    Pneumonia    Primary osteoarthritis, unspecified shoulder 03/12/2015    Osteoarthritis of shoulder     Past Surgical History:   Procedure Laterality Date    OTHER SURGICAL HISTORY  10/12/2021    Cataract surgery    OTHER SURGICAL HISTORY  10/12/2021    Skin lesion excision    OTHER SURGICAL HISTORY  10/12/2021    Hysterectomy    OTHER SURGICAL HISTORY  06/03/2021    Knee replacement    OTHER SURGICAL HISTORY  06/03/2021    Sinus surgery       Family History  No family history on file.    Social History  She has no history on file for tobacco use, alcohol use, and drug use.    Allergies  Erythromycin, Adhesive tape-silicones,  Risperidone, and Iodinated contrast media    Medications:  Current Outpatient Medications on File Prior to Visit   Medication Sig Dispense Refill    acetaminophen (Tylenol) 325 mg tablet Take 2 tablets (650 mg) by mouth every 6 hours.      albuterol 90 mcg/actuation inhaler Inhale 2 puffs every 4 hours if needed for shortness of breath or wheezing.      ALPRAZolam (Xanax) 0.5 mg tablet Take by mouth.      amLODIPine (Norvasc) 10 mg tablet Take 1 tablet (10 mg) by mouth once daily.      ammonium lactate (Amlactin) 12 % cream 1 Application      amoxicillin-pot clavulanate (Augmentin) 875-125 mg tablet Take 1 tablet (875 mg) by mouth 2 times a day. For 7 days      atorvastatin (Lipitor) 80 mg tablet Take 1 tablet (80 mg) by mouth once daily.      azelastine (Astelin) 137 mcg (0.1 %) nasal spray Administer 2 sprays into each nostril 2 times a day.      benzonatate (Tessalon) 100 mg capsule Take 1 capsule (100 mg) by mouth 3 times a day as needed for cough.      betamethasone dipropionate 0.05 % cream 1 Application      busPIRone (Buspar) 5 mg tablet Take 1 tablet (5 mg) by mouth 3 times a day.      calcium carbonate-vitamin D3 (Caltrate with Vitamin D3) 600 mg-20 mcg (800 unit) tablet Take by mouth.      citalopram hydrobromide (CITALOPRAM ORAL) Take 1 tablet by mouth once daily.      docusate sodium (Colace) 100 mg capsule Take 1 capsule (100 mg) by mouth 2 times a day.      doxycycline (Vibramycin) 100 mg capsule Take 1 capsule (100 mg) by mouth 2 times a day.      famotidine (Pepcid) 40 mg tablet Take 1 tablet (40 mg) by mouth once daily.      Flowflex COVID-19 Ag Home Test kit USE AS DIRECTED      FLUoxetine (PROzac) 10 mg capsule Take by mouth.      FLUoxetine (PROzac) 40 mg capsule Take 1 capsule (40 mg) by mouth once daily.      fluticasone (Flonase) 50 mcg/actuation nasal spray Administer 2 sprays into each nostril once daily.      fluticasone (Flovent) 44 mcg/actuation inhaler Inhale 2 puffs 2 times a day.       gabapentin (Neurontin) 300 mg capsule Take 1 capsule (300 mg) by mouth 4 times a day.      hydroCHLOROthiazide (Microzide) 12.5 mg capsule Take 1 capsule (12.5 mg) by mouth once daily.      hydrocortisone 2.5 % cream 1 Application      losartan (Cozaar) 100 mg tablet Take 1 tablet (100 mg) by mouth once daily.      meloxicam (Mobic) 7.5 mg tablet Take 1 tablet (7.5 mg) by mouth once daily as needed.      mirabegron (Myrbetriq) 50 mg tablet extended release 24 hr 24 hr tablet Take 1 tablet (50 mg) by mouth once daily. 90 tablet 3    mupirocin (Bactroban) 2 % ointment Apply topically 3 times a day. as directed      polyethylene glycol (Glycolax, Miralax) 17 gram/dose powder Take 17 g by mouth if needed.      polyethylene glycol 1450,bulk, powder USE AS DIRECTED.      predniSONE (Deltasone) 10 mg tablet 1 tablet (10 mg).      solifenacin (VESIcare) 10 mg tablet Take by mouth.      traMADol (Ultram) 50 mg tablet Take 1 tablet (50 mg) by mouth every 6 hours if needed.      triamcinolone (Kenalog) 0.1 % cream 1 Application      zolpidem CR (Ambien CR) 12.5 mg ER tablet Take 1 tablet (12.5 mg) by mouth once daily at bedtime.      zolpidem CR (Ambien CR) 6.25 mg ER tablet Take 1 tablet (6.25 mg) by mouth as needed at bedtime for sleep.       No current facility-administered medications on file prior to visit.        Review of Systems   A comprehensive 10+ review of systems was negative except for: see hpi          Physical Exam                                                                                                                      General: Well developed, well nourished, alert and cooperative, appears in no acute distress  Head: Normocephalic, atraumatic  Neck: supple, trachea midline  Eyes: Non-injected conjunctiva, sclera clear, no proptosis  Cardiac: Extremities are warm and well perfused. No edema, cyanosis or pallor.   Lungs: Breathing is easy, non-labored. Speaking in clear and complete sentences. Normal  diaphragmatic movement.  Abdomen: soft, non-distended, non-tender, no rebound or guarding, no hernia and no CVA tenderness   MSK: Ambulatory with steady gait, unassisted  Neuro: alert and oriented to person, place and time  Psych: Demonstrates good judgement and reason, without hallucinations, abnormal affect or abnormal behaviors.  Skin: no obvious lesions, no rashes      Labs  N/A    Imaging  N/A    IMPRESSION AND PLAN:  This is a 79 y.o. female, with OAB and KONSTANTIN      OAB:   -moderate response with Myrbetriq, wants to continue for 8 weeks   -Continue Myrbetriq about 90%-100% improved   -Doing well, will continue on this treatment plan  -Myrbetriq sent to her mail away pharmacy     Stress incontinence:  -currently not an issue, expectant management     Follow-up in 6 months    All questions and concerns were answered and addressed.  The patient expressed understanding and agrees with the plan.     Reviewed and approved by AL TILLEY on 9/12/24 at 7:25 AM.

## 2024-09-13 ENCOUNTER — OFFICE VISIT (OUTPATIENT)
Dept: WOUND CARE | Facility: HOSPITAL | Age: 79
End: 2024-09-13
Payer: MEDICARE

## 2024-09-13 PROCEDURE — 97597 DBRDMT OPN WND 1ST 20 CM/<: CPT

## 2024-09-20 ENCOUNTER — OFFICE VISIT (OUTPATIENT)
Dept: WOUND CARE | Facility: HOSPITAL | Age: 79
End: 2024-09-20
Payer: MEDICARE

## 2024-09-20 PROCEDURE — 97597 DBRDMT OPN WND 1ST 20 CM/<: CPT

## 2024-09-27 ENCOUNTER — OFFICE VISIT (OUTPATIENT)
Dept: WOUND CARE | Facility: HOSPITAL | Age: 79
End: 2024-09-27
Payer: MEDICARE

## 2024-09-27 PROCEDURE — 97597 DBRDMT OPN WND 1ST 20 CM/<: CPT

## 2025-03-13 ENCOUNTER — APPOINTMENT (OUTPATIENT)
Dept: UROLOGY | Facility: CLINIC | Age: 80
End: 2025-03-13
Payer: MEDICARE

## 2025-03-13 ENCOUNTER — OFFICE VISIT (OUTPATIENT)
Dept: UROLOGY | Facility: CLINIC | Age: 80
End: 2025-03-13
Payer: MEDICARE

## 2025-03-13 DIAGNOSIS — N39.3 SUI (STRESS URINARY INCONTINENCE, FEMALE): ICD-10-CM

## 2025-03-13 DIAGNOSIS — N32.81 OAB (OVERACTIVE BLADDER): Primary | ICD-10-CM

## 2025-03-13 PROCEDURE — 99214 OFFICE O/P EST MOD 30 MIN: CPT | Performed by: STUDENT IN AN ORGANIZED HEALTH CARE EDUCATION/TRAINING PROGRAM

## 2025-03-13 PROCEDURE — G2211 COMPLEX E/M VISIT ADD ON: HCPCS | Performed by: STUDENT IN AN ORGANIZED HEALTH CARE EDUCATION/TRAINING PROGRAM

## 2025-03-13 RX ORDER — MIRABEGRON 50 MG/1
50 TABLET, FILM COATED, EXTENDED RELEASE ORAL DAILY
Qty: 90 TABLET | Refills: 3 | Status: SHIPPED | OUTPATIENT
Start: 2025-03-13 | End: 2026-03-13

## 2025-03-13 NOTE — PROGRESS NOTES
"HISTORY OF PRESENT ILLNESS:  Margaret Thomas is a 79 y.o. female who presents today for a follow up visit. She is doing well with the medication. She is happy with it.           Past Medical History  She has a past medical history of Allergy status to unspecified drugs, medicaments and biological substances (03/12/2015), Depression, unspecified (03/12/2015), Osteoarthritis of knee, unspecified, Personal history of malignant neoplasm, unspecified, Personal history of other diseases of the circulatory system (01/30/2015), Personal history of other diseases of the digestive system (01/30/2015), Personal history of pulmonary embolism (01/30/2015), Pneumonia, unspecified organism (03/12/2015), and Primary osteoarthritis, unspecified shoulder (03/12/2015).    Surgical History  She has a past surgical history that includes Other surgical history (10/12/2021); Other surgical history (10/12/2021); Other surgical history (10/12/2021); Other surgical history (06/03/2021); and Other surgical history (06/03/2021).     Social History  She has no history on file for tobacco use, alcohol use, and drug use.    Family History  No family history on file.     Allergies  Erythromycin, Adhesive tape-silicones, Risperidone, and Iodinated contrast media      A comprehensive 10+ review of systems was negative except for: see hpi                          PHYSICAL EXAMINATION:  BP Readings from Last 3 Encounters:   02/07/24 135/72   10/23/23 (!) 186/73   04/27/23 157/79      Wt Readings from Last 3 Encounters:   09/12/24 90.7 kg (200 lb)   02/07/24 87.1 kg (192 lb 0.3 oz)   10/23/23 86.2 kg (190 lb 0.6 oz)      BMI: Estimated body mass index is 34.33 kg/m² as calculated from the following:    Height as of 9/12/24: 1.626 m (5' 4\").    Weight as of 9/12/24: 90.7 kg (200 lb).  BSA: Estimated body surface area is 2.02 meters squared as calculated from the following:    Height as of 9/12/24: 1.626 m (5' 4\").    Weight as of 9/12/24: 90.7 kg " (200 lb).  HEENT: Normocephalic, atraumatic, PER EOMI, nonicteric, trachea normal, thyroid normal, oropharynx normal.  CARDIAC: regular rate & rhythm, S1 & S2 normal.  No heaves, thrills, gallops or murmurs.  LUNGS: Clear to auscultation, no spinal or CV tenderness.  EXTREMITIES: No evidence of cyanosis, clubbing or edema.               Assessment:  80 yo with OAB and KONSTANTIN      OAB:   moderate response with Myrbetriq, wants to continue for 8 weeks   Continue Myrbetriq about 80% improved   Doing well  Continue this       Stress incontinence:  currently not an issue       Follow up in 1 year        All questions and concerns were answered and addressed.  The patient expressed understanding and agrees with the plan.     Jese Ribera MD    Scribe Attestation  By signing my name below, IDena, Scribe   attest that this documentation has been prepared under the direction and in the presence of Jese Ribera MD.

## 2025-07-09 ENCOUNTER — OFFICE VISIT (OUTPATIENT)
Dept: WOUND CARE | Facility: HOSPITAL | Age: 80
End: 2025-07-09
Payer: MEDICARE

## 2025-07-09 DIAGNOSIS — L98.499 ARTERIAL INSUFFICIENCY WITH ISCHEMIC ULCER (MULTI): Primary | ICD-10-CM

## 2025-07-09 DIAGNOSIS — I77.1 ARTERIAL INSUFFICIENCY WITH ISCHEMIC ULCER (MULTI): Primary | ICD-10-CM

## 2025-07-09 PROCEDURE — 99213 OFFICE O/P EST LOW 20 MIN: CPT | Mod: 25

## 2025-07-09 PROCEDURE — 11042 DBRDMT SUBQ TIS 1ST 20SQCM/<: CPT

## 2025-07-15 DIAGNOSIS — N32.81 OAB (OVERACTIVE BLADDER): ICD-10-CM

## 2025-07-15 RX ORDER — MIRABEGRON 50 MG/1
50 TABLET, FILM COATED, EXTENDED RELEASE ORAL DAILY
Qty: 90 TABLET | Refills: 3 | Status: SHIPPED | OUTPATIENT
Start: 2025-07-15 | End: 2026-07-15

## 2025-07-18 ENCOUNTER — OFFICE VISIT (OUTPATIENT)
Dept: WOUND CARE | Facility: HOSPITAL | Age: 80
End: 2025-07-18
Payer: MEDICARE

## 2025-07-18 PROCEDURE — 99213 OFFICE O/P EST LOW 20 MIN: CPT

## 2025-07-21 ENCOUNTER — ANCILLARY PROCEDURE (OUTPATIENT)
Dept: VASCULAR MEDICINE | Facility: CLINIC | Age: 80
End: 2025-07-21
Payer: MEDICARE

## 2025-07-21 DIAGNOSIS — I77.1 ARTERIAL INSUFFICIENCY WITH ISCHEMIC ULCER (MULTI): ICD-10-CM

## 2025-07-21 DIAGNOSIS — L98.499 ARTERIAL INSUFFICIENCY WITH ISCHEMIC ULCER (MULTI): ICD-10-CM

## 2025-07-21 DIAGNOSIS — I73.9 PERIPHERAL VASCULAR DISEASE, UNSPECIFIED: ICD-10-CM

## 2025-07-21 PROCEDURE — 93922 UPR/L XTREMITY ART 2 LEVELS: CPT

## 2025-07-21 PROCEDURE — 93922 UPR/L XTREMITY ART 2 LEVELS: CPT | Performed by: SURGERY

## 2025-07-22 ENCOUNTER — OFFICE VISIT (OUTPATIENT)
Dept: WOUND CARE | Facility: HOSPITAL | Age: 80
End: 2025-07-22
Payer: MEDICARE

## 2025-07-22 PROCEDURE — 99212 OFFICE O/P EST SF 10 MIN: CPT

## 2025-07-25 ENCOUNTER — APPOINTMENT (OUTPATIENT)
Dept: WOUND CARE | Facility: HOSPITAL | Age: 80
End: 2025-07-25
Payer: MEDICARE

## 2025-08-01 ENCOUNTER — OFFICE VISIT (OUTPATIENT)
Dept: VASCULAR SURGERY | Facility: CLINIC | Age: 80
End: 2025-08-01
Payer: MEDICARE

## 2025-08-01 VITALS
DIASTOLIC BLOOD PRESSURE: 67 MMHG | BODY MASS INDEX: 31.41 KG/M2 | HEART RATE: 73 BPM | WEIGHT: 184 LBS | RESPIRATION RATE: 14 BRPM | SYSTOLIC BLOOD PRESSURE: 142 MMHG | HEIGHT: 64 IN

## 2025-08-01 DIAGNOSIS — I73.9 PAD (PERIPHERAL ARTERY DISEASE): Primary | ICD-10-CM

## 2025-08-01 DIAGNOSIS — I77.819 AORTIC ECTASIA: ICD-10-CM

## 2025-08-01 PROCEDURE — 3078F DIAST BP <80 MM HG: CPT | Performed by: NURSE PRACTITIONER

## 2025-08-01 PROCEDURE — 3077F SYST BP >= 140 MM HG: CPT | Performed by: NURSE PRACTITIONER

## 2025-08-01 PROCEDURE — 99203 OFFICE O/P NEW LOW 30 MIN: CPT | Performed by: NURSE PRACTITIONER

## 2025-08-01 PROCEDURE — 1036F TOBACCO NON-USER: CPT | Performed by: NURSE PRACTITIONER

## 2025-08-01 PROCEDURE — 99213 OFFICE O/P EST LOW 20 MIN: CPT | Performed by: NURSE PRACTITIONER

## 2025-08-01 PROCEDURE — 1126F AMNT PAIN NOTED NONE PRSNT: CPT | Performed by: NURSE PRACTITIONER

## 2025-08-01 PROCEDURE — 1160F RVW MEDS BY RX/DR IN RCRD: CPT | Performed by: NURSE PRACTITIONER

## 2025-08-01 PROCEDURE — 1159F MED LIST DOCD IN RCRD: CPT | Performed by: NURSE PRACTITIONER

## 2025-08-01 ASSESSMENT — LIFESTYLE VARIABLES
AUDIT-C TOTAL SCORE: 0
AUDIT TOTAL SCORE: 0
SKIP TO QUESTIONS 9-10: 1
HOW MANY STANDARD DRINKS CONTAINING ALCOHOL DO YOU HAVE ON A TYPICAL DAY: PATIENT DOES NOT DRINK
HAS A RELATIVE, FRIEND, DOCTOR, OR ANOTHER HEALTH PROFESSIONAL EXPRESSED CONCERN ABOUT YOUR DRINKING OR SUGGESTED YOU CUT DOWN: NO
HAVE YOU OR SOMEONE ELSE BEEN INJURED AS A RESULT OF YOUR DRINKING: NO
HOW OFTEN DO YOU HAVE SIX OR MORE DRINKS ON ONE OCCASION: NEVER
HOW OFTEN DO YOU HAVE A DRINK CONTAINING ALCOHOL: NEVER

## 2025-08-01 ASSESSMENT — ENCOUNTER SYMPTOMS
LOSS OF SENSATION IN FEET: 0
DEPRESSION: 0
OCCASIONAL FEELINGS OF UNSTEADINESS: 0

## 2025-08-01 ASSESSMENT — PATIENT HEALTH QUESTIONNAIRE - PHQ9
SUM OF ALL RESPONSES TO PHQ9 QUESTIONS 1 AND 2: 0
1. LITTLE INTEREST OR PLEASURE IN DOING THINGS: NOT AT ALL
2. FEELING DOWN, DEPRESSED OR HOPELESS: NOT AT ALL

## 2025-08-01 ASSESSMENT — PAIN SCALES - GENERAL: PAINLEVEL_OUTOF10: 0-NO PAIN

## 2025-08-01 NOTE — PROGRESS NOTES
Chief Complaint  Peripheral Vascular Disease  Ectasia of Abdominal Aorta    History Of Present Illness  Margaret Thomas is a 80 y.o. female, patient of Dr. Chavis, referred to our office to discuss her recent PVR/JUAQUIN study results.     The patient was seen by Dr. Chavis at [facility], for a wound on her right anterior shin, which has since healed.  She mentioned to him that she has peripheral vascular disease and the patient was scheduled for a PVR/JUAQUIN study, which was completed on 7/21/2025.  Her right JUAQUIN measured 0.60, the left measured 0.88.       The patient has no complaints of claudication or ischemic rest pain within her bilateral lower extremities.  She remains very active in her community and has done a lot of landscaping over the last 2 months.    She sees Dr. Butler at the [facility] for monitoring of this condition but has not had any treatment completed.  When last seen by Dr. Butler in 11/2024, her ABIs were the same as when measured in July 2025.    The patient was recently diagnosed with ectasia of her abdominal aorta.  She has no complaints of abdominal or mid-back pain associated with her aorta.  In 11/2024, her abdominal aorta measured 2.8 cm.         Past Medical History  She has a past medical history of Allergy status to unspecified drugs, medicaments and biological substances (03/12/2015), Depression, unspecified (03/12/2015), Osteoarthritis of knee, unspecified, Personal history of malignant neoplasm, unspecified, Personal history of other diseases of the circulatory system (01/30/2015), Personal history of other diseases of the digestive system (01/30/2015), Personal history of pulmonary embolism (01/30/2015), Pneumonia, unspecified organism (03/12/2015), and Primary osteoarthritis, unspecified shoulder (03/12/2015).    Surgical History  She has a past surgical history that includes Other surgical history (10/12/2021); Other surgical history (10/12/2021); Other  surgical history (10/12/2021); Other surgical history (06/03/2021); and Other surgical history (06/03/2021).     Social History  She reports that she has never smoked. She has never been exposed to tobacco smoke. She has never used smokeless tobacco. Drug use questions deferred to the physician. She reports that she does not drink alcohol.    Family History  Family History[1]     Allergies  Erythromycin, Adhesive tape-silicones, Risperidone, and Iodinated contrast media    Medications  Current Medications[2]     Review of Systems   CONSTITUTIONAL: Denies weight loss, fever and chills.  HEENT: Denies changes in vision and hearing.  RESPIRATORY: Denies SOB and cough.  CV: Denies palpitations and CP.  GI: Denies abdominal pain, nausea, vomiting and diarrhea.   : Denies dysuria and urinary frequency.   MSK: Denies myalgia and joint pain.  SKIN: Denies rash and pruritus.   VASC: Denies claudication and ischemic rest pain.  NEUROLOGICAL: Denies headache and syncope.  PSYCHIATRIC: Denies recent changes in mood. Denies anxiety and depression.    Physical exam  Constitutional:  Alert and oriented to person, place, and time.  In no acute distress.    HEENT:  Head is atraumatic, normocephalic.    Neck:  Soft and nontender.  No cervical bruits present.    Respiratory:  Lungs clear to auscultation.    Cardiac:  Regular rate and rhythm.  No murmurs present.      Abdominal:  Soft, nontender, nondistended, with bowel sounds present.   Extremities:  Lower extremities warm to touch and normal in color.  No edema or open wounds present within bilateral legs or feet.      Pulse exam:  Radial pulses are palpable and equal.  Right pedal pulses are not palpable.  Left DP and PT pulse is palpable.    Musculoskeletal:  Moves extremities freely.  Dermatological: Skin color, texture, turgor normal.  No rashes of lesions present.    Neurological:  No focal deficits.    Psych:  Calm, cooperative.  Answers questions appropriately.      Last  Recorded Vitals  /67 (BP Location: Right arm, Patient Position: Sitting, BP Cuff Size: Large adult)   Pulse 73   Resp 14   Wt 83.5 kg (184 lb)     Relevant Results  No results found for this or any previous visit (from the past 24 hours).    Vascular US ankle brachial index (JUAQUIN) without exercise  Result Date: 7/21/2025           [facility] [id number] [place] [address]            Phone [phone]  Vascular Lab Report  [facility] US ANKLE BRACHIAL INDEX (JUAQUIN) WITHOUT EXERCISE Patient Name:      FRANK COBURNOSCAR Xiong Physician:  [id number] Mu Elias MD Study Date:        7/21/2025            Ordering Provider:  [id number] ANAYA ARMENDARIZ MRN/PID:           [id number]             Fellow: Accession#:        [id number]         Technologist:       Jeniffer Mann RVT Date of Birth/Age: 1945 / 80 years Technologist 2: Gender:            F                    Encounter#:         [id number] Admission Status:  Outpatient           Location Performed: [facility]  Diagnosis/ICD: Peripheral vascular disease, unspecified-I73.9 CPT Codes:     20877 Peripheral artery JUAQUIN Only  CONCLUSIONS: Right Lower PVR: Evidence of moderate arterial occlusive disease in the right lower extremity at rest. Decreased digital perfusion noted. Monophasic flow is noted in the right posterior tibial artery and right dorsalis pedis artery. Left Lower PVR: Evidence of mild arterial occlusive disease in the left lower extremity at rest. Normal digital perfusion noted. Monophasic flow is noted in the left dorsalis pedis artery. Multiphasic flow is noted in the left posterior tibial artery.  Imaging & Doppler Findings:  RIGHT Lower PVR                Pressures Ratios Right Posterior Tibial (Ankle) 75 mmHg   0.60 Right Dorsalis Pedis (Ankle)   71 mmHg   0.57 Right Digit (Great Toe)        50 mmHg   0.40   LEFT Lower PVR                Pressures Ratios Left Posterior Tibial (Ankle) 97 mmHg   0.78 Left Dorsalis Pedis  (Ankle)   110 mmHg  0.88 Left Digit (Great Toe)        76 mmHg   0.61                      Right     Left Brachial Pressure 122 mmHg 125 mmHg   [id number] Mu Elias MD Electronically signed by [id number] Mu Elias MD on 7/21/2025 at 5:02:42 PM  ** Final **       Assessment/Plan  Peripheral Vascular Disease  Ectasia of Abdominal Aorta    This is an 80-year-old female with peripheral vascular disease and ectasia of her abdominal aorta.    I discussed the patient's PVR/JUAQUIN test findings from July, with the patient and her friend.  I also spoke about the ectasia of her abdominal aorta.    The patient remains stable from a vascular standpoint.  As she has no skin breakdown to her bilateral legs or complaints of claudication, no vascular intervention is warranted at this time.    The patient would like to continue her vascular care with our practice.  She will be scheduled for a repeat PVR/JUAQUIN study to evaluate the circulation in her bilateral legs and an aortoiliac ultrasound to evaluate her abdominal aorta in 1 year.  She will then make an appointment with our office to discuss those results.    She knows to contact our office prior to her next scheduled appointment, should she have a change to her current status.    Milagro Leal, APRN-CNP         [1] No family history on file.  [2]   Current Outpatient Medications:     acetaminophen (Tylenol) 325 mg tablet, Take 2 tablets (650 mg) by mouth every 6 hours., Disp: , Rfl:     albuterol 90 mcg/actuation inhaler, Inhale 2 puffs every 4 hours if needed for shortness of breath or wheezing., Disp: , Rfl:     ALPRAZolam (Xanax) 0.5 mg tablet, Take by mouth., Disp: , Rfl:     amLODIPine (Norvasc) 10 mg tablet, Take 1 tablet (10 mg) by mouth once daily., Disp: , Rfl:     ammonium lactate (Amlactin) 12 % cream, 1 Application, Disp: , Rfl:     amoxicillin-pot clavulanate (Augmentin) 875-125 mg tablet, Take 1 tablet by mouth 2 times a day. For 7 days, Disp: , Rfl:     atorvastatin  (Lipitor) 80 mg tablet, Take 1 tablet (80 mg) by mouth once daily., Disp: , Rfl:     azelastine (Astelin) 137 mcg (0.1 %) nasal spray, Administer 2 sprays into each nostril 2 times a day., Disp: , Rfl:     benzonatate (Tessalon) 100 mg capsule, Take 1 capsule (100 mg) by mouth 3 times a day as needed for cough., Disp: , Rfl:     betamethasone dipropionate 0.05 % cream, 1 Application, Disp: , Rfl:     busPIRone (Buspar) 5 mg tablet, Take 1 tablet (5 mg) by mouth 3 times a day., Disp: , Rfl:     calcium carbonate-vitamin D3 (Caltrate with Vitamin D3) 600 mg-20 mcg (800 unit) tablet, Take by mouth., Disp: , Rfl:     citalopram hydrobromide (CITALOPRAM ORAL), Take 1 tablet by mouth once daily., Disp: , Rfl:     docusate sodium (Colace) 100 mg capsule, Take 1 capsule (100 mg) by mouth 2 times a day., Disp: , Rfl:     doxycycline (Vibramycin) 100 mg capsule, Take 1 capsule (100 mg) by mouth 2 times a day., Disp: , Rfl:     famotidine (Pepcid) 40 mg tablet, Take 1 tablet (40 mg) by mouth once daily., Disp: , Rfl:     Flowflex COVID-19 Ag Home Test kit, USE AS DIRECTED, Disp: , Rfl:     FLUoxetine (PROzac) 10 mg capsule, Take by mouth., Disp: , Rfl:     FLUoxetine (PROzac) 40 mg capsule, Take 1 capsule (40 mg) by mouth once daily., Disp: , Rfl:     fluticasone (Flonase) 50 mcg/actuation nasal spray, Administer 2 sprays into each nostril once daily., Disp: , Rfl:     fluticasone (Flovent) 44 mcg/actuation inhaler, Inhale 2 puffs 2 times a day., Disp: , Rfl:     gabapentin (Neurontin) 300 mg capsule, Take 1 capsule (300 mg) by mouth 4 times a day., Disp: , Rfl:     hydroCHLOROthiazide (Microzide) 12.5 mg capsule, Take 1 capsule (12.5 mg) by mouth once daily., Disp: , Rfl:     hydrocortisone 2.5 % cream, 1 Application, Disp: , Rfl:     losartan (Cozaar) 100 mg tablet, Take 1 tablet (100 mg) by mouth once daily., Disp: , Rfl:     meloxicam (Mobic) 7.5 mg tablet, Take 1 tablet (7.5 mg) by mouth once daily as needed., Disp: ,  Rfl:     mirabegron (Myrbetriq) 50 mg tablet extended release 24 hr 24 hr tablet, Take 1 tablet (50 mg) by mouth once daily., Disp: 90 tablet, Rfl: 3    mupirocin (Bactroban) 2 % ointment, Apply topically 3 times a day. as directed, Disp: , Rfl:     polyethylene glycol (Glycolax, Miralax) 17 gram/dose powder, Mix 17 g of powder and drink if needed., Disp: , Rfl:     polyethylene glycol 1450,bulk, powder, USE AS DIRECTED., Disp: , Rfl:     predniSONE (Deltasone) 10 mg tablet, 1 tablet (10 mg)., Disp: , Rfl:     solifenacin (VESIcare) 10 mg tablet, Take by mouth., Disp: , Rfl:     traMADol (Ultram) 50 mg tablet, Take 1 tablet (50 mg) by mouth every 6 hours if needed., Disp: , Rfl:     triamcinolone (Kenalog) 0.1 % cream, 1 Application, Disp: , Rfl:     zolpidem CR (Ambien CR) 12.5 mg ER tablet, Take 1 tablet (12.5 mg) by mouth once daily at bedtime., Disp: , Rfl:     zolpidem CR (Ambien CR) 6.25 mg ER tablet, Take 1 tablet (6.25 mg) by mouth as needed at bedtime for sleep., Disp: , Rfl:

## 2025-09-03 ENCOUNTER — APPOINTMENT (OUTPATIENT)
Dept: OTOLARYNGOLOGY | Facility: CLINIC | Age: 80
End: 2025-09-03
Payer: MEDICARE

## 2025-11-24 ENCOUNTER — APPOINTMENT (OUTPATIENT)
Dept: OTOLARYNGOLOGY | Facility: CLINIC | Age: 80
End: 2025-11-24
Payer: MEDICARE

## 2026-03-12 ENCOUNTER — APPOINTMENT (OUTPATIENT)
Dept: UROLOGY | Facility: CLINIC | Age: 81
End: 2026-03-12
Payer: MEDICARE